# Patient Record
Sex: FEMALE | Race: WHITE | Employment: UNEMPLOYED | URBAN - METROPOLITAN AREA
[De-identification: names, ages, dates, MRNs, and addresses within clinical notes are randomized per-mention and may not be internally consistent; named-entity substitution may affect disease eponyms.]

---

## 2022-06-15 ENCOUNTER — APPOINTMENT (OUTPATIENT)
Dept: GENERAL RADIOLOGY | Age: 72
DRG: 190 | End: 2022-06-15
Payer: MEDICARE

## 2022-06-15 ENCOUNTER — HOSPITAL ENCOUNTER (INPATIENT)
Age: 72
LOS: 3 days | Discharge: HOME | DRG: 190 | End: 2022-06-18
Attending: INTERNAL MEDICINE | Admitting: INTERNAL MEDICINE
Payer: MEDICARE

## 2022-06-15 DIAGNOSIS — E87.1 HYPONATREMIA: ICD-10-CM

## 2022-06-15 DIAGNOSIS — N39.0 URINARY TRACT INFECTION WITHOUT HEMATURIA, SITE UNSPECIFIED: ICD-10-CM

## 2022-06-15 DIAGNOSIS — J44.1 COPD EXACERBATION (HCC): Primary | ICD-10-CM

## 2022-06-15 LAB
ALBUMIN SERPL-MCNC: 4 G/DL (ref 3.5–4.6)
ALP BLD-CCNC: 123 U/L (ref 40–130)
ALT SERPL-CCNC: 13 U/L (ref 0–33)
ANION GAP SERPL CALCULATED.3IONS-SCNC: 12 MEQ/L (ref 9–15)
APTT: 36.9 SEC (ref 24.4–36.8)
AST SERPL-CCNC: 16 U/L (ref 0–35)
BACTERIA: ABNORMAL /HPF
BASOPHILS ABSOLUTE: 0 K/UL (ref 0–0.2)
BASOPHILS RELATIVE PERCENT: 0.4 %
BILIRUB SERPL-MCNC: 0.4 MG/DL (ref 0.2–0.7)
BILIRUBIN URINE: NEGATIVE
BLOOD, URINE: NEGATIVE
BUN BLDV-MCNC: 8 MG/DL (ref 8–23)
CALCIUM SERPL-MCNC: 8.5 MG/DL (ref 8.5–9.9)
CHLORIDE BLD-SCNC: 88 MEQ/L (ref 95–107)
CLARITY: CLEAR
CO2: 26 MEQ/L (ref 20–31)
COLOR: YELLOW
CREAT SERPL-MCNC: 0.46 MG/DL (ref 0.5–0.9)
EOSINOPHILS ABSOLUTE: 0 K/UL (ref 0–0.7)
EOSINOPHILS RELATIVE PERCENT: 0.2 %
EPITHELIAL CELLS, UA: ABNORMAL /HPF (ref 0–5)
GFR AFRICAN AMERICAN: >60
GFR NON-AFRICAN AMERICAN: >60
GLOBULIN: 2.7 G/DL (ref 2.3–3.5)
GLUCOSE BLD-MCNC: 101 MG/DL (ref 70–99)
GLUCOSE URINE: NEGATIVE MG/DL
HCT VFR BLD CALC: 32.6 % (ref 37–47)
HEMOGLOBIN: 11.1 G/DL (ref 12–16)
HYALINE CASTS: ABNORMAL /HPF (ref 0–5)
INR BLD: 1.1
KETONES, URINE: ABNORMAL MG/DL
LACTIC ACID: 0.7 MMOL/L (ref 0.5–2.2)
LEUKOCYTE ESTERASE, URINE: ABNORMAL
LYMPHOCYTES ABSOLUTE: 1.1 K/UL (ref 1–4.8)
LYMPHOCYTES RELATIVE PERCENT: 9 %
MCH RBC QN AUTO: 32.1 PG (ref 27–31.3)
MCHC RBC AUTO-ENTMCNC: 34.2 % (ref 33–37)
MCV RBC AUTO: 93.9 FL (ref 82–100)
MONOCYTES ABSOLUTE: 1.1 K/UL (ref 0.2–0.8)
MONOCYTES RELATIVE PERCENT: 8.7 %
NEUTROPHILS ABSOLUTE: 9.9 K/UL (ref 1.4–6.5)
NEUTROPHILS RELATIVE PERCENT: 82 %
NITRITE, URINE: NEGATIVE
PDW BLD-RTO: 12.6 % (ref 11.5–14.5)
PH UA: 6 (ref 5–9)
PLATELET # BLD: 278 K/UL (ref 130–400)
PLATELET SLIDE REVIEW: NORMAL
POTASSIUM SERPL-SCNC: 4.1 MEQ/L (ref 3.4–4.9)
PRO-BNP: 768 PG/ML
PROCALCITONIN: 0.1 NG/ML (ref 0–0.15)
PROTEIN UA: NEGATIVE MG/DL
PROTHROMBIN TIME: 14.6 SEC (ref 12.3–14.9)
RBC # BLD: 3.47 M/UL (ref 4.2–5.4)
RBC # BLD: NORMAL 10*6/UL
RBC UA: ABNORMAL /HPF (ref 0–5)
SARS-COV-2, NAAT: NOT DETECTED
SODIUM BLD-SCNC: 126 MEQ/L (ref 135–144)
SPECIFIC GRAVITY UA: 1 (ref 1–1.03)
TOTAL CK: 99 U/L (ref 0–170)
TOTAL PROTEIN: 6.7 G/DL (ref 6.3–8)
TROPONIN: <0.01 NG/ML (ref 0–0.01)
URINE REFLEX TO CULTURE: YES
UROBILINOGEN, URINE: 0.2 E.U./DL
WBC # BLD: 12.1 K/UL (ref 4.8–10.8)
WBC UA: ABNORMAL /HPF (ref 0–5)

## 2022-06-15 PROCEDURE — 84484 ASSAY OF TROPONIN QUANT: CPT

## 2022-06-15 PROCEDURE — 87040 BLOOD CULTURE FOR BACTERIA: CPT

## 2022-06-15 PROCEDURE — 94761 N-INVAS EAR/PLS OXIMETRY MLT: CPT

## 2022-06-15 PROCEDURE — 87186 SC STD MICRODIL/AGAR DIL: CPT

## 2022-06-15 PROCEDURE — 6360000002 HC RX W HCPCS: Performed by: PERSONAL EMERGENCY RESPONSE ATTENDANT

## 2022-06-15 PROCEDURE — 6370000000 HC RX 637 (ALT 250 FOR IP): Performed by: INTERNAL MEDICINE

## 2022-06-15 PROCEDURE — 71045 X-RAY EXAM CHEST 1 VIEW: CPT

## 2022-06-15 PROCEDURE — 82550 ASSAY OF CK (CPK): CPT

## 2022-06-15 PROCEDURE — 85025 COMPLETE CBC W/AUTO DIFF WBC: CPT

## 2022-06-15 PROCEDURE — 83605 ASSAY OF LACTIC ACID: CPT

## 2022-06-15 PROCEDURE — 83880 ASSAY OF NATRIURETIC PEPTIDE: CPT

## 2022-06-15 PROCEDURE — 80053 COMPREHEN METABOLIC PANEL: CPT

## 2022-06-15 PROCEDURE — 85610 PROTHROMBIN TIME: CPT

## 2022-06-15 PROCEDURE — 6360000002 HC RX W HCPCS: Performed by: INTERNAL MEDICINE

## 2022-06-15 PROCEDURE — 85730 THROMBOPLASTIN TIME PARTIAL: CPT

## 2022-06-15 PROCEDURE — 87635 SARS-COV-2 COVID-19 AMP PRB: CPT

## 2022-06-15 PROCEDURE — 2580000003 HC RX 258: Performed by: PERSONAL EMERGENCY RESPONSE ATTENDANT

## 2022-06-15 PROCEDURE — 6360000002 HC RX W HCPCS: Performed by: PHYSICIAN ASSISTANT

## 2022-06-15 PROCEDURE — 1210000000 HC MED SURG R&B

## 2022-06-15 PROCEDURE — 87086 URINE CULTURE/COLONY COUNT: CPT

## 2022-06-15 PROCEDURE — 2580000003 HC RX 258: Performed by: INTERNAL MEDICINE

## 2022-06-15 PROCEDURE — 84145 PROCALCITONIN (PCT): CPT

## 2022-06-15 PROCEDURE — 93005 ELECTROCARDIOGRAM TRACING: CPT | Performed by: PHYSICIAN ASSISTANT

## 2022-06-15 PROCEDURE — 87077 CULTURE AEROBIC IDENTIFY: CPT

## 2022-06-15 PROCEDURE — 96374 THER/PROPH/DIAG INJ IV PUSH: CPT

## 2022-06-15 PROCEDURE — 36415 COLL VENOUS BLD VENIPUNCTURE: CPT

## 2022-06-15 PROCEDURE — 81001 URINALYSIS AUTO W/SCOPE: CPT

## 2022-06-15 PROCEDURE — 99285 EMERGENCY DEPT VISIT HI MDM: CPT

## 2022-06-15 PROCEDURE — 6370000000 HC RX 637 (ALT 250 FOR IP): Performed by: PHYSICIAN ASSISTANT

## 2022-06-15 PROCEDURE — 94640 AIRWAY INHALATION TREATMENT: CPT

## 2022-06-15 PROCEDURE — 94664 DEMO&/EVAL PT USE INHALER: CPT

## 2022-06-15 RX ORDER — IPRATROPIUM BROMIDE AND ALBUTEROL SULFATE 2.5; .5 MG/3ML; MG/3ML
1 SOLUTION RESPIRATORY (INHALATION) 3 TIMES DAILY
Status: DISCONTINUED | OUTPATIENT
Start: 2022-06-16 | End: 2022-06-18 | Stop reason: HOSPADM

## 2022-06-15 RX ORDER — EPINEPHRINE 0.3 MG/.3ML
0.3 INJECTION SUBCUTANEOUS
COMMUNITY
Start: 2021-08-30

## 2022-06-15 RX ORDER — MULTIVIT,TX WITH IRON,MINERALS
250 TABLET, EXTENDED RELEASE ORAL
COMMUNITY
Start: 2021-12-08

## 2022-06-15 RX ORDER — BUPROPION HYDROCHLORIDE 150 MG/1
TABLET, EXTENDED RELEASE ORAL
COMMUNITY
Start: 2021-07-12

## 2022-06-15 RX ORDER — METHYLPREDNISOLONE SODIUM SUCCINATE 125 MG/2ML
125 INJECTION, POWDER, LYOPHILIZED, FOR SOLUTION INTRAMUSCULAR; INTRAVENOUS ONCE
Status: COMPLETED | OUTPATIENT
Start: 2022-06-15 | End: 2022-06-15

## 2022-06-15 RX ORDER — BUDESONIDE 0.5 MG/2ML
0.5 INHALANT ORAL 2 TIMES DAILY
Status: DISCONTINUED | OUTPATIENT
Start: 2022-06-15 | End: 2022-06-18 | Stop reason: HOSPADM

## 2022-06-15 RX ORDER — SODIUM CHLORIDE 9 MG/ML
INJECTION, SOLUTION INTRAVENOUS CONTINUOUS
Status: DISCONTINUED | OUTPATIENT
Start: 2022-06-15 | End: 2022-06-18

## 2022-06-15 RX ORDER — IPRATROPIUM BROMIDE AND ALBUTEROL SULFATE 2.5; .5 MG/3ML; MG/3ML
1 SOLUTION RESPIRATORY (INHALATION) EVERY 4 HOURS PRN
Status: DISCONTINUED | OUTPATIENT
Start: 2022-06-15 | End: 2022-06-15

## 2022-06-15 RX ORDER — ATORVASTATIN CALCIUM 20 MG/1
20 TABLET, FILM COATED ORAL
Status: ON HOLD | COMMUNITY
Start: 2021-12-08 | End: 2022-06-18 | Stop reason: HOSPADM

## 2022-06-15 RX ORDER — AMLODIPINE BESYLATE 5 MG/1
5 TABLET ORAL DAILY
Status: DISCONTINUED | OUTPATIENT
Start: 2022-06-15 | End: 2022-06-18 | Stop reason: HOSPADM

## 2022-06-15 RX ORDER — SODIUM CHLORIDE 9 MG/ML
INJECTION, SOLUTION INTRAVENOUS PRN
Status: DISCONTINUED | OUTPATIENT
Start: 2022-06-15 | End: 2022-06-18 | Stop reason: HOSPADM

## 2022-06-15 RX ORDER — ACETAMINOPHEN 325 MG/1
650 TABLET ORAL EVERY 6 HOURS PRN
Status: DISCONTINUED | OUTPATIENT
Start: 2022-06-15 | End: 2022-06-18 | Stop reason: HOSPADM

## 2022-06-15 RX ORDER — KRILL/OM-3/DHA/EPA/PHOSPHO/AST 500-110 MG
500 CAPSULE ORAL
COMMUNITY
Start: 2021-12-08

## 2022-06-15 RX ORDER — ENOXAPARIN SODIUM 100 MG/ML
30 INJECTION SUBCUTANEOUS DAILY
Status: DISCONTINUED | OUTPATIENT
Start: 2022-06-15 | End: 2022-06-18 | Stop reason: HOSPADM

## 2022-06-15 RX ORDER — IPRATROPIUM BROMIDE AND ALBUTEROL SULFATE 2.5; .5 MG/3ML; MG/3ML
1 SOLUTION RESPIRATORY (INHALATION)
Status: DISCONTINUED | OUTPATIENT
Start: 2022-06-15 | End: 2022-06-15

## 2022-06-15 RX ORDER — SODIUM CHLORIDE 0.9 % (FLUSH) 0.9 %
5-40 SYRINGE (ML) INJECTION EVERY 12 HOURS SCHEDULED
Status: DISCONTINUED | OUTPATIENT
Start: 2022-06-15 | End: 2022-06-18 | Stop reason: HOSPADM

## 2022-06-15 RX ORDER — POLYETHYLENE GLYCOL 3350 17 G/17G
17 POWDER, FOR SOLUTION ORAL DAILY PRN
Status: DISCONTINUED | OUTPATIENT
Start: 2022-06-15 | End: 2022-06-18 | Stop reason: HOSPADM

## 2022-06-15 RX ORDER — ROSUVASTATIN CALCIUM 20 MG/1
20 TABLET, COATED ORAL NIGHTLY
Status: DISCONTINUED | OUTPATIENT
Start: 2022-06-15 | End: 2022-06-18 | Stop reason: HOSPADM

## 2022-06-15 RX ORDER — PROMETHAZINE HYDROCHLORIDE 12.5 MG/1
25 TABLET ORAL EVERY 6 HOURS PRN
Status: DISCONTINUED | OUTPATIENT
Start: 2022-06-15 | End: 2022-06-18 | Stop reason: HOSPADM

## 2022-06-15 RX ORDER — PREDNISONE 20 MG/1
40 TABLET ORAL DAILY
Status: DISCONTINUED | OUTPATIENT
Start: 2022-06-15 | End: 2022-06-18 | Stop reason: HOSPADM

## 2022-06-15 RX ORDER — 0.9 % SODIUM CHLORIDE 0.9 %
1000 INTRAVENOUS SOLUTION INTRAVENOUS ONCE
Status: COMPLETED | OUTPATIENT
Start: 2022-06-15 | End: 2022-06-15

## 2022-06-15 RX ORDER — BUPROPION HYDROCHLORIDE 150 MG/1
150 TABLET, EXTENDED RELEASE ORAL 2 TIMES DAILY
Status: DISCONTINUED | OUTPATIENT
Start: 2022-06-15 | End: 2022-06-18 | Stop reason: HOSPADM

## 2022-06-15 RX ORDER — ASPIRIN 81 MG/1
81 TABLET ORAL DAILY
Status: DISCONTINUED | OUTPATIENT
Start: 2022-06-15 | End: 2022-06-18 | Stop reason: HOSPADM

## 2022-06-15 RX ORDER — NICOTINE 21 MG/24HR
1 PATCH, TRANSDERMAL 24 HOURS TRANSDERMAL DAILY
Status: DISCONTINUED | OUTPATIENT
Start: 2022-06-15 | End: 2022-06-18 | Stop reason: HOSPADM

## 2022-06-15 RX ORDER — ONDANSETRON 2 MG/ML
4 INJECTION INTRAMUSCULAR; INTRAVENOUS EVERY 6 HOURS PRN
Status: DISCONTINUED | OUTPATIENT
Start: 2022-06-15 | End: 2022-06-18 | Stop reason: HOSPADM

## 2022-06-15 RX ORDER — ACETAMINOPHEN 650 MG/1
650 SUPPOSITORY RECTAL EVERY 6 HOURS PRN
Status: DISCONTINUED | OUTPATIENT
Start: 2022-06-15 | End: 2022-06-18 | Stop reason: HOSPADM

## 2022-06-15 RX ORDER — ROSUVASTATIN CALCIUM 20 MG/1
20 TABLET, COATED ORAL NIGHTLY
COMMUNITY
Start: 2022-05-02

## 2022-06-15 RX ORDER — ALBUTEROL SULFATE 2.5 MG/3ML
2.5 SOLUTION RESPIRATORY (INHALATION)
Status: DISCONTINUED | OUTPATIENT
Start: 2022-06-15 | End: 2022-06-18 | Stop reason: HOSPADM

## 2022-06-15 RX ORDER — ASCORBIC ACID 250 MG
250 TABLET ORAL
COMMUNITY
Start: 2021-12-08

## 2022-06-15 RX ORDER — FEXOFENADINE HCL 180 MG/1
180 TABLET ORAL
COMMUNITY
Start: 2021-12-08

## 2022-06-15 RX ORDER — SODIUM CHLORIDE 0.9 % (FLUSH) 0.9 %
5-40 SYRINGE (ML) INJECTION PRN
Status: DISCONTINUED | OUTPATIENT
Start: 2022-06-15 | End: 2022-06-18 | Stop reason: HOSPADM

## 2022-06-15 RX ORDER — AMLODIPINE BESYLATE 5 MG/1
5 TABLET ORAL
COMMUNITY
Start: 2021-12-08

## 2022-06-15 RX ADMIN — ASPIRIN 81 MG: 81 TABLET, COATED ORAL at 22:58

## 2022-06-15 RX ADMIN — IPRATROPIUM BROMIDE AND ALBUTEROL SULFATE 1 AMPULE: .5; 2.5 SOLUTION RESPIRATORY (INHALATION) at 17:54

## 2022-06-15 RX ADMIN — BUDESONIDE 500 MCG: 0.5 SUSPENSION RESPIRATORY (INHALATION) at 20:45

## 2022-06-15 RX ADMIN — SODIUM CHLORIDE: 9 INJECTION, SOLUTION INTRAVENOUS at 23:01

## 2022-06-15 RX ADMIN — METHYLPREDNISOLONE SODIUM SUCCINATE 125 MG: 125 INJECTION, POWDER, FOR SOLUTION INTRAMUSCULAR; INTRAVENOUS at 15:11

## 2022-06-15 RX ADMIN — PREDNISONE 40 MG: 20 TABLET ORAL at 22:56

## 2022-06-15 RX ADMIN — IPRATROPIUM BROMIDE AND ALBUTEROL SULFATE 1 AMPULE: 2.5; .5 SOLUTION RESPIRATORY (INHALATION) at 20:45

## 2022-06-15 RX ADMIN — ENOXAPARIN SODIUM 30 MG: 100 INJECTION SUBCUTANEOUS at 22:57

## 2022-06-15 RX ADMIN — CEFTRIAXONE SODIUM 1000 MG: 1 INJECTION, POWDER, FOR SOLUTION INTRAMUSCULAR; INTRAVENOUS at 18:51

## 2022-06-15 RX ADMIN — Medication 10 ML: at 23:05

## 2022-06-15 RX ADMIN — ROSUVASTATIN CALCIUM 20 MG: 20 TABLET, FILM COATED ORAL at 22:55

## 2022-06-15 RX ADMIN — BUPROPION HYDROCHLORIDE 150 MG: 150 TABLET, EXTENDED RELEASE ORAL at 22:57

## 2022-06-15 RX ADMIN — AMLODIPINE BESYLATE 5 MG: 5 TABLET ORAL at 22:57

## 2022-06-15 RX ADMIN — SODIUM CHLORIDE 1000 ML: 9 INJECTION, SOLUTION INTRAVENOUS at 18:53

## 2022-06-15 ASSESSMENT — ENCOUNTER SYMPTOMS
CONSTIPATION: 0
SHORTNESS OF BREATH: 1
VOMITING: 0
NAUSEA: 0
ABDOMINAL PAIN: 0
SORE THROAT: 0
COUGH: 1
ABDOMINAL DISTENTION: 0
WHEEZING: 0
RHINORRHEA: 0
COLOR CHANGE: 0
EYE DISCHARGE: 0
DIARRHEA: 0

## 2022-06-15 ASSESSMENT — PAIN - FUNCTIONAL ASSESSMENT: PAIN_FUNCTIONAL_ASSESSMENT: NONE - DENIES PAIN

## 2022-06-15 NOTE — H&P
Department of Internal Medicine  General Internal Medicine  Attending History and Physical      CHIEF COMPLAINT:  SOB    Reason for Admission:  Acute hypoxic respiratory failure 2/2 COPD, UTI    History Obtained From:  patient    HISTORY OF PRESENT ILLNESS:      The patient is a 74 y.o. female with significant past medical history of COPD, HTN/HLD, tobacco use who presents from her doctors office after she was found to be 70% on RA. Pt states that she made the appointment initially due to dysuria and frequency and thought she had a UTI, but states over the last 4 days, she has been getting progressively more sob and her home nebulizers have not been working. States that she was at [facility] last Saturday and there was a lot of cottonwood all over. States she only gets flare up maybe once or twice a year and she is unsure of any triggers but thinks this may have started it. Continues to smoke 1/2ppd but has not been smoking much in the last 4 days due to worsening breathing. She was given solumedrol and nebs by EMS and arrived in the ED on 2L O2 and SpO2 98%, but she had conversational dyspnea at first and when taken off the O2, her SpO2 dropped into the 80s. CXR with emphysema. Labs largely unremarkable. UA with LE and some bacteria. She was given abx, nebs and admitted for COPD exacerbation and symptomatic UTI. Past Medical History:        Diagnosis Date    COPD (chronic obstructive pulmonary disease) ([facility] [address]     Hyperlipidemia     Hypertension      Past Surgical History:        Procedure Laterality Date    HYSTERECTOMY (CERVIX STATUS UNKNOWN)         Medications Prior to Admission:    Not in a hospital admission. Allergies:  Patient has no known allergies. Social History:   1/2ppd smoker, occ etoh, no drugs    Family History:   History reviewed. No pertinent family history.   REVIEW OF SYSTEMS:  12 point ROS was negative unless otherwise noted in the HPI   PHYSICAL EXAM:    Vitals:  /72   Pulse (!) 107   Temp 98.6 °F (37 °C) (Oral)   Resp 23   Ht 5' 1\" (1.549 m)   Wt 106 lb (48.1 kg)   SpO2 95%   BMI 20.03 kg/m²     Constitutional: Awake and alert in no acute distress. Lying in bed comfortably  Head: Normocephalic, atraumatic  Eyes: EOMI, PERRLA  ENT: moist mucous membranes, nasal cannula  Neck: neck supple, trachea midline  Lungs: Good inspiratory effort, bilateral expiratory wheeze, no rhonchi, no rales  Heart: RRR, normal S1 and S2  GI: Soft, non-distended, non tender, no guarding, no rebound, +BS  MSK: no edema noted  Skin: warm, dry  Psych: appropriate affect       DATA:  CBC:   Lab Results   Component Value Date    WBC 12.1 06/15/2022    RBC 3.47 06/15/2022    HGB 11.1 06/15/2022    HCT 32.6 06/15/2022    MCV 93.9 06/15/2022    MCH 32.1 06/15/2022    MCHC 34.2 06/15/2022    RDW 12.6 06/15/2022     06/15/2022     CMP:    Lab Results   Component Value Date     06/15/2022    K 4.1 06/15/2022    CL 88 06/15/2022    CO2 26 06/15/2022    BUN 8 06/15/2022    CREATININE 0.46 06/15/2022    GFRAA >60.0 06/15/2022    LABGLOM >60.0 06/15/2022    GLUCOSE 101 06/15/2022    PROT 6.7 06/15/2022    LABALBU 4.0 06/15/2022    CALCIUM 8.5 06/15/2022    BILITOT 0.4 06/15/2022    ALKPHOS 123 06/15/2022    AST 16 06/15/2022    ALT 13 06/15/2022     ASSESSMENT AND PLAN:      # Acute hypoxic respiratory failure 2/2 COPD exacerbation  - not on home O2.  Was noted to be 70% on RA at [facility] office today and sent to the ED via EMS  - given steroids, nebs and weaned to 2L O2, but unable to wean off - wean off as tolerated  - CXR with emphysema  - COVID negative  - procalcitonin negative  - pulm consulted  - cont steroids, nebs    # Symptomatic UTI  - the initial reason for her appt today was dysuria  - UA with LE, bacteria  - started on Rocephin - will continue and complete 5 days total. PO abx at discharge  - follow cultures    # Tobacco use  - counseled on cessation  - nicotine patch    # HTN/HLD  - continued home meds as documented in care-everywhere    DVT: lovenox ppx    Disposition: Pt admitted after being found to be hypoxic at her doctors office. Treating COPD with steroids, nebs. Pulm consulted. Treating symptomatic UTI. Follow cultures. Anticipated length of stay greater than 48 hours.       Seun Coffey DO  Internal Medicine   Hospitalist    >45 minutes in total care time

## 2022-06-15 NOTE — ED PROVIDER NOTES
[facility]  eMERGENCY dEPARTMENT eNCOUnter      Pt Name: Brian Ross  MRN: [id number]  [place] 1950  Date of evaluation: 6/15/2022  Provider: Alexis Benedict PA-C    CHIEF COMPLAINT       Chief Complaint   Patient presents with    Shortness of Breath         HISTORY OF PRESENT ILLNESS   (Location/Symptom, Timing/Onset,Context/Setting, Quality, Duration, Modifying Factors, Severity)  Note limiting factors. Brian Ross is a 74 y.o. female who presents to the emergency department complaint of cough, and shortness of breath, which patient states been ongoing for the last 4 days, also complains of some burning, pain with urination. She states she had schedule appointment with her primary doctor today and during her visit, her saturations were found to be in the 70% range. She does have past history of COPD, she is not home O2 dependent. Cough is dry nonproductive, there is been no fevers, no chest pain, no nausea vomiting or dizziness. Past medical history is significant for hypertension, hyperlipidemia, COPD. Patient states she is tobacco smoker. HPI    NursingNotes were reviewed. REVIEW OF SYSTEMS    (2-9 systems for level 4, 10 or more for level 5)     Review of Systems   Constitutional: Negative for activity change, appetite change, fatigue and fever. HENT: Negative for congestion, ear discharge, ear pain, nosebleeds, rhinorrhea and sore throat. Eyes: Negative for discharge. Respiratory: Positive for cough and shortness of breath. Negative for wheezing. Cardiovascular: Negative for chest pain, palpitations and leg swelling. Gastrointestinal: Negative for abdominal distention, abdominal pain, constipation, diarrhea, nausea and vomiting. Genitourinary: Negative for difficulty urinating and dysuria. Musculoskeletal: Negative for arthralgias. Skin: Negative for color change. Neurological: Negative for dizziness, syncope, weakness, numbness and headaches. Smokeless tobacco: Never Used    Tobacco comment: x30+ years   Substance and Sexual Activity    Alcohol use: Yes     Comment: weekends    Drug use: Never    Sexual activity: None   Other Topics Concern    None   Social History Narrative    None     Social Determinants of Health     Financial Resource Strain:     Difficulty of Paying Living Expenses: Not on file   Food Insecurity:     Worried About Running Out of Food in the Last Year: Not on file    Farzaneh of Food in the Last Year: Not on file   Transportation Needs:     Lack of Transportation (Medical): Not on file    Lack of Transportation (Non-Medical):  Not on file   Physical Activity:     Days of Exercise per Week: Not on file    Minutes of Exercise per Session: Not on file   Stress:     Feeling of Stress : Not on file   Social Connections:     Frequency of Communication with Friends and Family: Not on file    Frequency of Social Gatherings with Friends and Family: Not on file    Attends Sabianist Services: Not on file    Active Member of [address] [place] [address] or Organizations: Not on file    Attends Club or Organization Meetings: Not on file    Marital Status: Not on file   Intimate Partner Violence:     Fear of Current or Ex-Partner: Not on file    Emotionally Abused: Not on file    Physically Abused: Not on file    Sexually Abused: Not on file   Housing Stability:     Unable to Pay for Housing in the Last Year: Not on file    Number of [place] in the Last Year: Not on file    Unstable Housing in the Last Year: Not on file       SCREENINGS    [place] Coma Scale  Eye Opening: Spontaneous  Best Verbal Response: Oriented  Best Motor Response: Obeys commands  Reymundo Coma Scale Score: 15 @FLOW([id number])@      PHYSICAL EXAM    (up to 7 for level 4, 8 or more for level 5)     ED Triage Vitals [06/15/22 1432]   BP Temp Temp Source Heart Rate Resp SpO2 Height Weight   (!) 140/72 98.6 °F (37 °C) Oral (!) 114 17 91 % 5' 1\" (1.549 m) 106 lb (48.1 kg) Physical Exam  Vitals and nursing note reviewed. Constitutional:       General: She is not in acute distress. Appearance: She is well-developed. She is not ill-appearing, toxic-appearing or diaphoretic. HENT:      Head: Normocephalic. Nose: No congestion. Mouth/Throat:      Mouth: Mucous membranes are moist.      Pharynx: No oropharyngeal exudate or posterior oropharyngeal erythema. Eyes:      Extraocular Movements: Extraocular movements intact. Conjunctiva/sclera: Conjunctivae normal.      Pupils: Pupils are equal, round, and reactive to light. Neck:      Vascular: No JVD. Trachea: No tracheal deviation. Cardiovascular:      Rate and Rhythm: Normal rate. Pulses: Normal pulses. Heart sounds: Normal heart sounds. No murmur heard. No friction rub. No gallop. Pulmonary:      Effort: Pulmonary effort is normal. No tachypnea, accessory muscle usage, respiratory distress or retractions. Breath sounds: No stridor. Examination of the right-upper field reveals decreased breath sounds. Examination of the left-upper field reveals decreased breath sounds. Examination of the right-middle field reveals decreased breath sounds. Examination of the left-middle field reveals decreased breath sounds. Examination of the right-lower field reveals decreased breath sounds. Examination of the left-lower field reveals decreased breath sounds. Decreased breath sounds present. No wheezing, rhonchi or rales. Comments: Diminished lung sounds in all fields, no wheezes rales or rhonchi, no excess muscle use, no retractions, saturations on 4 L nasal cannula oxygen is 91% patient is able to speak in full sentences. Chest:      Chest wall: No tenderness. Abdominal:      General: Abdomen is flat. Bowel sounds are normal. There is no distension or abdominal bruit. Palpations: There is no shifting dullness, fluid wave, hepatomegaly, splenomegaly, mass or pulsatile mass. Tenderness: There is no abdominal tenderness. There is no right CVA tenderness, left CVA tenderness, guarding or rebound. Negative signs include Barnett's sign, Rovsing's sign and McBurney's sign. Musculoskeletal:         General: No deformity. Cervical back: Normal range of motion and neck supple. No rigidity. Right lower leg: No edema. Left lower leg: No edema. Skin:     General: Skin is warm and dry. Capillary Refill: Capillary refill takes less than 2 seconds. Coloration: Skin is not jaundiced. Neurological:      General: No focal deficit present. Mental Status: She is alert and oriented to person, place, and time. Mental status is at baseline. Cranial Nerves: No cranial nerve deficit. Sensory: No sensory deficit. Motor: No weakness. Coordination: Coordination normal.   Psychiatric:         Mood and Affect: Mood normal.         DIAGNOSTIC RESULTS     EKG: All EKG's are interpreted by the Emergency Department Physician who either signs or Co-signsthis chart in the absence of a cardiologist.    EKG shows sinus tachycardia with premature atrial complexes at a 104 bpm, T wave inversions in leads V3 no ventricular ectopy  ms    RADIOLOGY:   Non-plain filmimages such as CT, Ultrasound and MRI are read by the radiologist. Plain radiographic images are visualized and preliminarily interpreted by the emergency physician with the below findings:        Interpretation per the Radiologist below, if available at the time ofthis note:    XR CHEST PORTABLE   Final Result   NO ACUTE CARDIOPULMONARY DISEASE. EMPHYSEMA.             ED BEDSIDE ULTRASOUND:   Performed by ED Physician - none    LABS:  Labs Reviewed   COMPREHENSIVE METABOLIC PANEL - Abnormal; Notable for the following components:       Result Value    Sodium 126 (*)     Chloride 88 (*)     Glucose 101 (*)     CREATININE 0.46 (*)     All other components within normal limits   CBC WITH AUTO DIFFERENTIAL - Abnormal; Notable for the following components:    WBC 12.1 (*)     RBC 3.47 (*)     Hemoglobin 11.1 (*)     Hematocrit 32.6 (*)     MCH 32.1 (*)     Neutrophils Absolute 9.9 (*)     Monocytes Absolute 1.1 (*)     All other components within normal limits   URINALYSIS WITH REFLEX TO CULTURE - Abnormal; Notable for the following components:    Ketones, Urine TRACE (*)     Leukocyte Esterase, Urine LARGE (*)     All other components within normal limits   APTT - Abnormal; Notable for the following components:    aPTT 36.9 (*)     All other components within normal limits   MICROSCOPIC URINALYSIS - Abnormal; Notable for the following components:    Bacteria, UA FEW (*)     WBC, UA 10-20 (*)     All other components within normal limits   BASIC METABOLIC PANEL W/ REFLEX TO MG FOR LOW K - Abnormal; Notable for the following components:    Glucose 150 (*)     CREATININE 0.42 (*)     Calcium 8.1 (*)     All other components within normal limits   COVID-19, RAPID   CULTURE, BLOOD 1   CULTURE, BLOOD 2   CULTURE, URINE   LACTIC ACID   TROPONIN   CK   PROTIME-INR   BRAIN NATRIURETIC PEPTIDE   PROCALCITONIN   CBC WITH AUTO DIFFERENTIAL       All other labs were within normal range or not returned as of this dictation. EMERGENCY DEPARTMENT COURSE and DIFFERENTIAL DIAGNOSIS/MDM:   Vitals:    Vitals:    06/15/22 1930 06/15/22 2015 06/15/22 2045 06/16/22 0100   BP: 134/80 (!) 151/76     Pulse: (!) 104 99 77    Resp: 27 18 18    Temp:  97.9 °F (36.6 °C)     TempSrc:  Oral     SpO2: 94% 93% 98%    Weight:    109 lb 9.1 oz (49.7 kg)   Height:             ED Course as of 06/16/22 0730   Wed Je 15, 2022   1824 Patient with history of hypertension, hyperlipidemia, COPD, carotid artery stenosis . she states for the past couple days she has been having dry cough with chest congestion, chest tightness, shortness of breath, wheezing, suprapubic abdominal pain and dysuria. Patient does smoke.   She does not have a nebulizer machine at home.  No blood thinners at home. She does admit to decreased appetite and mostly drinking coffee and minimal water. she went to doctor for concerns for UTI and was found to be hypoxic at 70s%. Ambulance gave epi, Solu-Medrol, DuoNeb, magnesium. Chest x-ray shows no acute process. WBC 12.1, sodium 126, chloride 88. Urine shows large leukocytes, few bacteria, 10-20 WBC. On reassessment patient still is tachypneic, using accessory muscles to breathe, 98 percent on 2 L. Lungs diminished throughout with faint expiratory wheezing and crackles in bilateral bases. Taken off oxygen she drops to 93% on room air. Not O2 dependent. Hyponatremia may be due to dehydration. Patient was given additional breathing treatments, Solu-Medrol, liter IV fluid and abx for UTI. She will be admitted at this time. [KO]      ED Course User Index  [KO] LOYD Juarez     MDM  Number of Diagnoses or Management Options  [facility]  Hyponatremia  Urinary tract infection without hematuria, site unspecified  Diagnosis management comments: Patient presents emerged part complaint of cough, shortness of breath which she states been ongoing for last 4 days, she went to see her regular family provider today, during the visit was found that her saturations were in the mid 80% range patient does have past history of COPD, she is not O2 dependent. On arrival to ED she was placed on supplemental O2 was doing well, saturation maintained about 94%. Chest x-ray shows no acute pulmonary process, labs are fairly nonspecific at this time. She was ambulated, and did become hypoxic short of breath during ambulation, and was admitted to the hospital under the [facility] hospitalist Dr. MEADE [facility]. Patient's admitting diagnosis that of COPD exacerbation hypoxia. CRITICAL CARE TIME   Total Critical Care time was 0 minutes, excluding separately reportableprocedures.   There was a high probability of clinicallysignificant/life

## 2022-06-16 LAB
ANION GAP SERPL CALCULATED.3IONS-SCNC: 9 MEQ/L (ref 9–15)
BASOPHILS ABSOLUTE: 0 K/UL (ref 0–0.2)
BASOPHILS RELATIVE PERCENT: 0 %
BUN BLDV-MCNC: 12 MG/DL (ref 8–23)
CALCIUM SERPL-MCNC: 8.1 MG/DL (ref 8.5–9.9)
CHLORIDE BLD-SCNC: 98 MEQ/L (ref 95–107)
CO2: 28 MEQ/L (ref 20–31)
CREAT SERPL-MCNC: 0.42 MG/DL (ref 0.5–0.9)
EKG ATRIAL RATE: 104 BPM
EKG P AXIS: 74 DEGREES
EKG P-R INTERVAL: 128 MS
EKG Q-T INTERVAL: 338 MS
EKG QRS DURATION: 82 MS
EKG QTC CALCULATION (BAZETT): 444 MS
EKG R AXIS: 72 DEGREES
EKG T AXIS: 36 DEGREES
EKG VENTRICULAR RATE: 104 BPM
EOSINOPHILS ABSOLUTE: 0 K/UL (ref 0–0.7)
EOSINOPHILS RELATIVE PERCENT: 0 %
GFR AFRICAN AMERICAN: >60
GFR NON-AFRICAN AMERICAN: >60
GLUCOSE BLD-MCNC: 150 MG/DL (ref 70–99)
HCT VFR BLD CALC: 34.1 % (ref 37–47)
HEMOGLOBIN: 11.6 G/DL (ref 12–16)
LYMPHOCYTES ABSOLUTE: 0.9 K/UL (ref 1–4.8)
LYMPHOCYTES RELATIVE PERCENT: 6.1 %
MCH RBC QN AUTO: 32.3 PG (ref 27–31.3)
MCHC RBC AUTO-ENTMCNC: 34 % (ref 33–37)
MCV RBC AUTO: 95.1 FL (ref 82–100)
MONOCYTES ABSOLUTE: 0.4 K/UL (ref 0.2–0.8)
MONOCYTES RELATIVE PERCENT: 3.1 %
NEUTROPHILS ABSOLUTE: 12.8 K/UL (ref 1.4–6.5)
NEUTROPHILS RELATIVE PERCENT: 90.8 %
PDW BLD-RTO: 12.8 % (ref 11.5–14.5)
PLATELET # BLD: 324 K/UL (ref 130–400)
POTASSIUM REFLEX MAGNESIUM: 4.3 MEQ/L (ref 3.4–4.9)
RBC # BLD: 3.58 M/UL (ref 4.2–5.4)
SODIUM BLD-SCNC: 135 MEQ/L (ref 135–144)
WBC # BLD: 14.1 K/UL (ref 4.8–10.8)

## 2022-06-16 PROCEDURE — 6360000002 HC RX W HCPCS: Performed by: INTERNAL MEDICINE

## 2022-06-16 PROCEDURE — 36415 COLL VENOUS BLD VENIPUNCTURE: CPT

## 2022-06-16 PROCEDURE — 6370000000 HC RX 637 (ALT 250 FOR IP): Performed by: INTERNAL MEDICINE

## 2022-06-16 PROCEDURE — 94150 VITAL CAPACITY TEST: CPT

## 2022-06-16 PROCEDURE — 80048 BASIC METABOLIC PNL TOTAL CA: CPT

## 2022-06-16 PROCEDURE — 2580000003 HC RX 258: Performed by: INTERNAL MEDICINE

## 2022-06-16 PROCEDURE — 94640 AIRWAY INHALATION TREATMENT: CPT

## 2022-06-16 PROCEDURE — 2700000000 HC OXYGEN THERAPY PER DAY

## 2022-06-16 PROCEDURE — 85025 COMPLETE CBC W/AUTO DIFF WBC: CPT

## 2022-06-16 PROCEDURE — 1210000000 HC MED SURG R&B

## 2022-06-16 PROCEDURE — 94761 N-INVAS EAR/PLS OXIMETRY MLT: CPT

## 2022-06-16 PROCEDURE — 99223 1ST HOSP IP/OBS HIGH 75: CPT | Performed by: INTERNAL MEDICINE

## 2022-06-16 RX ORDER — B-COMPLEX WITH VITAMIN C
140 TABLET ORAL DAILY
COMMUNITY
Start: 2021-12-08

## 2022-06-16 RX ORDER — OMEGA-3S/DHA/EPA/FISH OIL/D3 300MG-1000
10 CAPSULE ORAL DAILY
COMMUNITY
Start: 2021-12-08

## 2022-06-16 RX ADMIN — IPRATROPIUM BROMIDE AND ALBUTEROL SULFATE 1 AMPULE: 2.5; .5 SOLUTION RESPIRATORY (INHALATION) at 07:48

## 2022-06-16 RX ADMIN — SODIUM CHLORIDE: 9 INJECTION, SOLUTION INTRAVENOUS at 21:04

## 2022-06-16 RX ADMIN — CEFTRIAXONE SODIUM 1000 MG: 1 INJECTION, POWDER, FOR SOLUTION INTRAMUSCULAR; INTRAVENOUS at 18:51

## 2022-06-16 RX ADMIN — IPRATROPIUM BROMIDE AND ALBUTEROL SULFATE 1 AMPULE: 2.5; .5 SOLUTION RESPIRATORY (INHALATION) at 19:44

## 2022-06-16 RX ADMIN — PREDNISONE 40 MG: 20 TABLET ORAL at 08:43

## 2022-06-16 RX ADMIN — SODIUM CHLORIDE: 9 INJECTION, SOLUTION INTRAVENOUS at 11:09

## 2022-06-16 RX ADMIN — ENOXAPARIN SODIUM 30 MG: 100 INJECTION SUBCUTANEOUS at 08:44

## 2022-06-16 RX ADMIN — BUPROPION HYDROCHLORIDE 150 MG: 150 TABLET, EXTENDED RELEASE ORAL at 08:43

## 2022-06-16 RX ADMIN — IPRATROPIUM BROMIDE AND ALBUTEROL SULFATE 1 AMPULE: 2.5; .5 SOLUTION RESPIRATORY (INHALATION) at 12:39

## 2022-06-16 RX ADMIN — BUDESONIDE 500 MCG: 0.5 SUSPENSION RESPIRATORY (INHALATION) at 19:43

## 2022-06-16 RX ADMIN — BUDESONIDE 500 MCG: 0.5 SUSPENSION RESPIRATORY (INHALATION) at 07:49

## 2022-06-16 RX ADMIN — ROSUVASTATIN CALCIUM 20 MG: 20 TABLET, FILM COATED ORAL at 21:05

## 2022-06-16 RX ADMIN — BUPROPION HYDROCHLORIDE 150 MG: 150 TABLET, EXTENDED RELEASE ORAL at 21:05

## 2022-06-16 RX ADMIN — ASPIRIN 81 MG: 81 TABLET, COATED ORAL at 08:43

## 2022-06-16 RX ADMIN — Medication 10 ML: at 08:45

## 2022-06-16 RX ADMIN — Medication 10 ML: at 21:07

## 2022-06-16 NOTE — CONSULTS
Pulmonary and Critical Care Medicine  Consult Note  Encounter Date: 2022 2:41 PM    Ms. Mela Landau is a 74 y.o. female  : 1950  Requesting Provider: Kemal Person DO    Reason for request: COPD exacerbation            HISTORY OF PRESENT ILLNESS:    Patient is 74 y.o. presents with 4 days history of worsening shortness of breath, with cough productive of clear phlegm, chest pain mainly when she can cough otherwise none, no fever no chills, her grandson had viral illness recently, on arrival to ED she was saturating 70%, she feels better now, she is a smoker 10 cigarettes/day, no worsening lower extremity edema. Past Medical History:        Diagnosis Date    COPD (chronic obstructive pulmonary disease) (HCC)     Hyperlipidemia     Hypertension        Past Surgical History:        Procedure Laterality Date    HYSTERECTOMY (CERVIX STATUS UNKNOWN)         Social History:     reports that she has been smoking cigarettes. She has been smoking about 0.50 packs per day. She has never used smokeless tobacco. She reports current alcohol use. She reports that she does not use drugs. Family History:   History reviewed. No pertinent family history. No family history of lung disease  Allergies:   Other and Sulfamethoxazole        MEDICATIONS during current hospitalization:    Continuous Infusions:   sodium chloride      sodium chloride 100 mL/hr at 22 1109       Scheduled Meds:   sodium chloride flush  5-40 mL IntraVENous 2 times per day    enoxaparin  30 mg SubCUTAneous Daily    cefTRIAXone (ROCEPHIN) IV  1,000 mg IntraVENous Q24H    aspirin  81 mg Oral Daily    rosuvastatin  20 mg Oral Nightly    buPROPion  150 mg Oral BID    amLODIPine  5 mg Oral Daily    predniSONE  40 mg Oral Daily    budesonide  0.5 mg Nebulization BID    nicotine  1 patch TransDERmal Daily    ipratropium-albuterol  1 ampule Inhalation TID       PRN Meds:sodium chloride flush, sodium chloride, polyethylene glycol, acetaminophen **OR** acetaminophen, promethazine **OR** ondansetron, albuterol        REVIEW OF SYSTEMS:  ROS: 10 organs review of system is done including general, psychological, ENT, hematological, endocrine, respiratory, cardiovascular, gastrointestinal, musculoskeletal, neurological,  allergy and Immunology is done and is otherwise negative. PHYSICAL EXAM:    Vitals:  BP (!) 94/52   Pulse 99   Temp 98.2 °F (36.8 °C) (Oral)   Resp 16   Ht 5' 1\" (1.549 m)   Wt 109 lb 9.1 oz (49.7 kg)   SpO2 95%   BMI 20.70 kg/m²     General: alert, cooperative, no distress  Head: normocephalic, atraumatic  Eyes:No gross abnormalities. ENT:  MMM no lesions  Neck:  supple and no masses  Chest : Tight with bilateral wheezing, no rales, nontender, tympanic  Heart[de-identified] Heart sounds are normal.  Regular rate and rhythm without murmur, gallop or rub. ABD:  symmetric, soft, non-tender, no guarding or rebound  Musculoskeletal : no cyanosis, no clubbing and no edema  Neuro:  Grossly normal  Skin: No rashes or nodules noted. Lymph node:  no cervical nodes  Urology: No Rodriguez   Psychiatric: appropriate        Data Review  Recent Labs     06/15/22  1515 06/16/22  0606   WBC 12.1* 14.1*   HGB 11.1* 11.6*   HCT 32.6* 34.1*    324      Recent Labs     06/15/22  1515 06/16/22  0606   * 135   K 4.1 4.3   CL 88* 98   CO2 26 28   BUN 8 12   CREATININE 0.46* 0.42*   GLUCOSE 101* 150*       MV Settings: ABGs: No results for input(s): PHART, ERX4ALA, PO2ART, FXI6YFQ, BEART, U0UDOCVC, IMM2CJF in the last 72 hours.   O2 Device: Nasal cannula  O2 Flow Rate (L/min): 2 L/min  Lab Results   Component Value Date    LACTA 0.7 06/15/2022       Radiology  I personally reviewed imaging studies and no infiltrate        Assessment, plan:   Patient is at risk due to    · Acute hypoxic respiratory failure  · COPD with acute exacerbation  · Smoking     Recommendation  · Continue prednisone 40 mg daily  · Continue current nebs  · O2 to keep sat 88 to 90%  · Smoking cessation strongly recommended  · Incentive spirometry  · Patient on antibiotic for UTI, plan per primary team      Thank you for consultation    Electronically signed by Héctor Santana MD, [facility],  on 6/16/2022 at 2:41 PM

## 2022-06-16 NOTE — CARE COORDINATION
This Care Transition Nurse provided COPD booklet and zones sheet and reviewed. Stressed importance of phoning physician promptly for symptoms in the yellow zone and ems for symptoms in the red zone. Discussed cause of COPD, how lungs work, treatment. Discussed avoiding respiratory infection by good handwashing/hand sanitizing, masking to protect airway, avoiding ill contacts, keeping nebulizer setup and other respiratory equipment clean. Discussed common inhaled irritants to avoid. Discussed pursed lip breathing and abdominal breathing. Discussed positions  to reduce shortness of breath, energy conservation, COPD medications, importance of taking antibiotics and prednisone as ordered until gone, importance of keeping updated with vaccines. Discussed importance of nutrition, limiting sodium, drinking plenty of fluids especially water. Discussed benefits of Pulmonary Rehab. Stressed importance of smoking cessation. Informed of the Markie Suarez and Mel Jamison at [email or web address] as a resource for smoking cessation and more information about COPD. Stressed importance of physician (PCP) follow up within one week of discharge and follow up with pulmonologist as directed. Patient voiced understanding. Patient states she is smoking approximately 10 cigarettes per day. Strongly encouraged smoking cessation. She has quit smoking in the past and will quit again. She does not have a nebulizer at home and would like one. She has Stiolto Respimat inhaler which she uses once per day. Medication list states bid. Advised to check her prescription and use as directed. She state she also has an albuterol rescue inhaler. She does tend to wait too long to seek treatment. Stressed importance of phoning pulmonologist at first sign of symptoms in the yellow zone. She does drink some water but drinks mostly coffee. Cautioned against consuming too much caffeine and encouraged more water intake.  Patient states she was at [facility] and she noticed a lot of cottonwood trees shedding and thinks this may have been a trigger. She has also noticed a lot of pollen on her patio table which she has been cleaning off daily. Advised to wear a mask when cleaning or dusting. Patient has [facility] PCP and Pulmonologist. She will follow up with then as directed.

## 2022-06-16 NOTE — PROGRESS NOTES
Patient ambulated to bathroom and back to bed without oxygen on. Rt walked into  room for breathing tx when patient getting back into bed. Spo2 80% on room air. Placed 2L oxygen back on patient and Spo2 came up to 95%. RT placed extension tubing onto cannula to reach bathroom and talk to patient about leaving the oxygen on.  RT notified RN also.

## 2022-06-16 NOTE — PROGRESS NOTES
7167 - Assessment completed this shift. A&Ox4  VSS Pulse ox 95% on 3 L NC. Expiratory wheezes heard on ascultation. Pt has significant cough during periods of movement exacerbation. No mucous production noted. Abdomen is soft and flat. Bowel sounds are active. Pt denies having pain, nausea. Slight Bruises noted LUE. Pt ambulated to bathroom this morning w/o oxygen. Pt rechecked and saturations were at 78%: oxygen reapplied and pt sat was 92%+. Pt resting in bed with call light in reach. Meds given per [facility]    Per Dr. MEADE [facility] [employer], oxygen can be removed as she has been sating at 95%    Pt ambulated in recinos with PCA and . Upon returning to room, o2 was 87%. Slight dyspnea noted.      Electronically signed by Familia Godfrey RN on 6/16/2022 at 6:03 PM

## 2022-06-16 NOTE — PROGRESS NOTES
[facility] AT [place] Respiratory Therapy Evaluation   Current Order:  Duoneb Q4 PRN      Home Regimen: None      Ordering Physician: Marco Antonio  Re-evaluation Date:  6/18     Diagnosis: COPD Exac     Patient Status: Stable / Unstable + Physician notified    The following MDI Criteria must be met in order to convert aerosol to MDI with spacer.  If unable to meet, MDI will be converted to aerosol:  []  Patient able to demonstrate the ability to use MDI effectively  []  Patient alert and cooperative  []  Patient able to take deep breath with 5-10 second hold  []  Medication(s) available in this delivery method   []  Peak flow greater than or equal to 200 ml/min            Current Order Substituted To  (same drug, same frequency)   Aerosol to MDI [] Albuterol Sulfate 0.083% unit dose by aerosol Albuterol Sulfate MDI 2 puffs by inhalation with spacer    [] Levalbuterol 1.25 mg unit dose by aerosol Levalbuterol MDI 2 puffs by inhalation with spacer    [] Levalbuterol 0.63 mg unit dose by aerosol Levalbuterol MDI 2 puffs by inhalation with spacer    [] Ipratropium Bromide 0.02% unit dose by aerosol Ipratropium Bromide MDI 2 puffs by inhalation with spacer    [] Duoneb (Ipratropium + Albuterol) unit dose by aerosol Ipratropium MDI + Albuterol MDI 2 puffs by inhalation w/spacer   MDI to Aerosol [] Albuterol Sulfate MDI Albuterol Sulfate 0.083% unit dose by aerosol    [] Levalbuterol MDI 2 puffs by inhalation Levalbuterol 1.25 mg unit dose by aerosol    [] Ipratropium Bromide MDI by inhalation Ipratropium Bromide 0.02% unit dose by aerosol    [] Combivent (Ipratropium + Albuterol) MDI by inhalation Duoneb (Ipratropium + Albuterol) unit dose by aerosol       Treatment Assessment [Frequency/Schedule]:  Change frequency to: ________Duoneb TID & Albuterol Q2 PRN__________________________________________per Protocol, P&T, MEC      Points 0 1 2 3 4   Pulmonary Status  Non-Smoker  []   Smoking history   < 20 pack years  []   Smoking history  ?  20 pack years  []   Pulmonary Disorder  (acute or chronic)  []   Severe or Chronic w/ Exacerbation  [x]     Surgical Status No [x]   Surgeries     General []   Surgery Lower []   Abdominal Thoracic or []   Upper Abdominal Thoracic with  PulmonaryDisorder  []     Chest X-ray Clear/Not  Ordered     [x]  Chronic Changes  Results Pending  []  Infiltrates, atelectasis, pleural effusion, or edema  []  Infiltrates in more than one lobe []  Infiltrate + Atelectasis, &/or pleural effusion  []    Respiratory Pattern Regular,  RR = 12-20 [x]  Increased,  RR = 21-25 []  PARKER, irregular,  or RR = 26-30 []  Decreased FEV1  or RR = 31-35 []  Severe SOB, use  of accessory muscles, or RR ? 35  []    Mental Status Alert, oriented,  Cooperative [x]  Confused but Follows commands []  Lethargic or unable to follow commands []  Obtunded  []  Comatose  []    Breath Sounds Clear to  auscultation  []  Decreased unilaterally or  in bases only []  Decreased  bilaterally  [x]  Crackles or intermittent wheezes []  Wheezes []    Cough Strong, Spontan., & nonproductive [x]  Strong,  spontaneous, &  productive []  Weak,  Nonproductive []  Weak, productive or  with wheezes []  No spontaneous  cough or may require suctioning []    Level of Activity Ambulatory [x]  Ambulatory w/ Assist  []  Non-ambulatory []  Paraplegic []  Quadriplegic []    Total    Score:___6____     Triage Score:____4____      Tri       Triage:     1. (>20) Freq: Q3    2. (16-20) Freq: Q4   3. (11-15) Freq: QID & Albuterol Q2 PRN    4. (6-10) Freq: TID & Albuterol Q2 PRN    5. (0-5) Freq Q4prn

## 2022-06-16 NOTE — PLAN OF CARE
Problem: Discharge Planning  Goal: Discharge to home or other facility with appropriate resources  Outcome: Progressing  Flowsheets (Taken 6/15/2022 2740)  Discharge to home or other facility with appropriate resources: Identify discharge learning needs (meds, wound care, etc)     Problem: Safety - Adult  Goal: Free from fall injury  Outcome: Progressing

## 2022-06-16 NOTE — CARE COORDINATION
[facility] AT [place] Case Management Initial Discharge Assessment    Met with Patient to discuss discharge plan. PCP: CHAGO Iyer [address]                                Date of Last Visit:  2 months ago    [facility] Patient: No        [facility] Notified: no    If no PCP, list provided? N/A    Discharge Planning    Living Arrangements: independently at home    Who do you live with? , Elisa Valle     Who helps you with your care:  self    If lives at home:     Do you have any barriers navigating in your home? no    Patient can perform ADL? Yes    Current Services (outpatient and in home) :  None    Dialysis: No    Is transportation available to get to your appointments? Yes    DME Equipment:  no    Respiratory equipment: None    Respiratory provider:  no     Pharmacy:  yes - [facility] [employer] with Medication Assistance Program?  No      [facility]? No    Patient agreeable to SNF/Rehab? No    Other discharge needs identified? N/A    Does Patient Have a High-Risk for Readmission Diagnosis (CHF, PN, MI, COPD)? Yes / COPD        Initial Discharge Plan? (Note: please see concurrent daily documentation for any updates after initial note). Patient is alert, oriented and pleasant. Patient states that she will return home upon discharge. Patient is supported well by her , Elisa Valle. Patient denies needs.      Readmission Risk              Risk of Unplanned Readmission:  [address]  Electronically signed by DANETTE Gupta, ASHLEY on 6/16/2022 at 9:57 AM

## 2022-06-16 NOTE — PROGRESS NOTES
Department of Internal Medicine  General Internal Medicine  Attending Progress Note      SUBJECTIVE:  Pt seen and examined. Still sob, but improved today. Was on 2L but upon exam, O2 turned off and SpO2 95% so turned off. OBJECTIVE      Medications    Current Facility-Administered Medications: sodium chloride flush 0.9 % injection 5-40 mL, 5-40 mL, IntraVENous, 2 times per day  sodium chloride flush 0.9 % injection 5-40 mL, 5-40 mL, IntraVENous, PRN  0.9 % sodium chloride infusion, , IntraVENous, PRN  polyethylene glycol (GLYCOLAX) packet 17 g, 17 g, Oral, Daily PRN  enoxaparin Sodium (LOVENOX) injection 30 mg, 30 mg, SubCUTAneous, Daily  acetaminophen (TYLENOL) tablet 650 mg, 650 mg, Oral, Q6H PRN **OR** acetaminophen (TYLENOL) suppository 650 mg, 650 mg, Rectal, Q6H PRN  cefTRIAXone (ROCEPHIN) 1000 mg IVPB in 50 mL D5W minibag, 1,000 mg, IntraVENous, Q24H  aspirin EC tablet 81 mg, 81 mg, Oral, Daily  rosuvastatin (CRESTOR) tablet 20 mg, 20 mg, Oral, Nightly  buPROPion (WELLBUTRIN SR) extended release tablet 150 mg, 150 mg, Oral, BID  amLODIPine (NORVASC) tablet 5 mg, 5 mg, Oral, Daily  0.9 % sodium chloride infusion, , IntraVENous, Continuous  promethazine (PHENERGAN) tablet 25 mg, 25 mg, Oral, Q6H PRN **OR** ondansetron (ZOFRAN) injection 4 mg, 4 mg, IntraVENous, Q6H PRN  predniSONE (DELTASONE) tablet 40 mg, 40 mg, Oral, Daily  budesonide (PULMICORT) nebulizer suspension 500 mcg, 0.5 mg, Nebulization, BID  nicotine (NICODERM CQ) 14 MG/24HR 1 patch, 1 patch, TransDERmal, Daily  ipratropium-albuterol (DUONEB) nebulizer solution 1 ampule, 1 ampule, Inhalation, TID  albuterol (PROVENTIL) nebulizer solution 2.5 mg, 2.5 mg, Nebulization, Q2H PRN  Physical    VITALS:  BP (!) 94/52   Pulse 99   Temp 98.2 °F (36.8 °C) (Oral)   Resp 16   Ht 5' 1\" (1.549 m)   Wt 109 lb 9.1 oz (49.7 kg)   SpO2 95%   BMI 20.70 kg/m²   Constitutional: Awake and alert in no acute distress.  Lying in bed comfortably  Head: Normocephalic, atraumatic  Eyes: EOMI, PERRLA  ENT: moist mucous membranes  Neck: neck supple, trachea midline  Lungs: Good inspiratory effort, inspiratory and expiratory wheezes  Heart: RRR, normal S1 and S2  GI: Soft, non-distended, non tender, no guarding, no rebound, +BS  MSK: no edema noted  Skin: warm, dry  Psych: appropriate affect     Data    CBC:   Lab Results   Component Value Date    WBC 14.1 06/16/2022    RBC 3.58 06/16/2022    HGB 11.6 06/16/2022    HCT 34.1 06/16/2022    MCV 95.1 06/16/2022    MCH 32.3 06/16/2022    MCHC 34.0 06/16/2022    RDW 12.8 06/16/2022     06/16/2022     CMP:    Lab Results   Component Value Date     06/16/2022    K 4.3 06/16/2022    CL 98 06/16/2022    CO2 28 06/16/2022    BUN 12 06/16/2022    CREATININE 0.42 06/16/2022    GFRAA >60.0 06/16/2022    LABGLOM >60.0 06/16/2022    GLUCOSE 150 06/16/2022    PROT 6.7 06/15/2022    LABALBU 4.0 06/15/2022    CALCIUM 8.1 06/16/2022    BILITOT 0.4 06/15/2022    ALKPHOS 123 06/15/2022    AST 16 06/15/2022    ALT 13 06/15/2022       ASSESSMENT AND PLAN      # Acute hypoxic respiratory failure 2/2 COPD exacerbation  - not on home O2.  Was noted to be 70% on RA at [facility] office today and sent to the ED via EMS  - given steroids, nebs and weaned to 2L O2, but unable to wean off - weaned off today but still with significant wheezing  - CXR with emphysema  - COVID negative  - procalcitonin negative  - pulm consulted  - cont steroids, nebs     # Symptomatic UTI  - the initial reason for her office appt 6/15 was dysuria  - UA with LE, bacteria  - started on Rocephin - will continue and complete 5 days total. PO abx at discharge  - follow cultures    # Leukocytosis  - likely due to steroids  - already on abx for UTI     # Tobacco use  - counseled on cessation  - nicotine patch     # HTN/HLD  - continued home meds as documented in care-everywhere     DVT: lovenox ppx     Disposition: Pt admitted after being found to be hypoxic at her doctors office. Treating COPD with steroids, nebs. Pulm consulted. Treating symptomatic UTI. Follow cultures. Still with wheezing. Possible discharge tomorrow if improved.       Jarod Ferguson DO  Internal Medicine   Hospitalist    >35 minutes in total care time

## 2022-06-17 LAB
ANION GAP SERPL CALCULATED.3IONS-SCNC: 10 MEQ/L (ref 9–15)
BASOPHILS ABSOLUTE: 0 K/UL (ref 0–0.2)
BASOPHILS RELATIVE PERCENT: 0.1 %
BUN BLDV-MCNC: 8 MG/DL (ref 8–23)
CALCIUM SERPL-MCNC: 8.2 MG/DL (ref 8.5–9.9)
CHLORIDE BLD-SCNC: 101 MEQ/L (ref 95–107)
CO2: 26 MEQ/L (ref 20–31)
CREAT SERPL-MCNC: 0.49 MG/DL (ref 0.5–0.9)
EOSINOPHILS ABSOLUTE: 0 K/UL (ref 0–0.7)
EOSINOPHILS RELATIVE PERCENT: 0 %
GFR AFRICAN AMERICAN: >60
GFR NON-AFRICAN AMERICAN: >60
GLUCOSE BLD-MCNC: 99 MG/DL (ref 70–99)
HCT VFR BLD CALC: 31.9 % (ref 37–47)
HEMOGLOBIN: 10.7 G/DL (ref 12–16)
LYMPHOCYTES ABSOLUTE: 2.5 K/UL (ref 1–4.8)
LYMPHOCYTES RELATIVE PERCENT: 15.7 %
MCH RBC QN AUTO: 32.1 PG (ref 27–31.3)
MCHC RBC AUTO-ENTMCNC: 33.7 % (ref 33–37)
MCV RBC AUTO: 95.4 FL (ref 82–100)
MONOCYTES ABSOLUTE: 1.5 K/UL (ref 0.2–0.8)
MONOCYTES RELATIVE PERCENT: 9.5 %
NEUTROPHILS ABSOLUTE: 11.7 K/UL (ref 1.4–6.5)
NEUTROPHILS RELATIVE PERCENT: 74.7 %
ORGANISM: ABNORMAL
PDW BLD-RTO: 13.1 % (ref 11.5–14.5)
PLATELET # BLD: 334 K/UL (ref 130–400)
POTASSIUM REFLEX MAGNESIUM: 4 MEQ/L (ref 3.4–4.9)
RBC # BLD: 3.34 M/UL (ref 4.2–5.4)
SODIUM BLD-SCNC: 137 MEQ/L (ref 135–144)
URINE CULTURE, ROUTINE: ABNORMAL
URINE CULTURE, ROUTINE: ABNORMAL
WBC # BLD: 15.6 K/UL (ref 4.8–10.8)

## 2022-06-17 PROCEDURE — 6360000002 HC RX W HCPCS: Performed by: INTERNAL MEDICINE

## 2022-06-17 PROCEDURE — 94640 AIRWAY INHALATION TREATMENT: CPT

## 2022-06-17 PROCEDURE — 94660 CPAP INITIATION&MGMT: CPT

## 2022-06-17 PROCEDURE — 80048 BASIC METABOLIC PNL TOTAL CA: CPT

## 2022-06-17 PROCEDURE — 2700000000 HC OXYGEN THERAPY PER DAY

## 2022-06-17 PROCEDURE — 2580000003 HC RX 258: Performed by: INTERNAL MEDICINE

## 2022-06-17 PROCEDURE — 1210000000 HC MED SURG R&B

## 2022-06-17 PROCEDURE — 94761 N-INVAS EAR/PLS OXIMETRY MLT: CPT

## 2022-06-17 PROCEDURE — 6370000000 HC RX 637 (ALT 250 FOR IP): Performed by: INTERNAL MEDICINE

## 2022-06-17 PROCEDURE — 85025 COMPLETE CBC W/AUTO DIFF WBC: CPT

## 2022-06-17 PROCEDURE — 36415 COLL VENOUS BLD VENIPUNCTURE: CPT

## 2022-06-17 PROCEDURE — 99232 SBSQ HOSP IP/OBS MODERATE 35: CPT | Performed by: INTERNAL MEDICINE

## 2022-06-17 RX ORDER — GUAIFENESIN/DEXTROMETHORPHAN 100-10MG/5
10 SYRUP ORAL EVERY 4 HOURS PRN
Status: DISCONTINUED | OUTPATIENT
Start: 2022-06-17 | End: 2022-06-18 | Stop reason: HOSPADM

## 2022-06-17 RX ADMIN — BUDESONIDE 500 MCG: 0.5 SUSPENSION RESPIRATORY (INHALATION) at 20:26

## 2022-06-17 RX ADMIN — IPRATROPIUM BROMIDE AND ALBUTEROL SULFATE 1 AMPULE: 2.5; .5 SOLUTION RESPIRATORY (INHALATION) at 20:26

## 2022-06-17 RX ADMIN — IPRATROPIUM BROMIDE AND ALBUTEROL SULFATE 1 AMPULE: 2.5; .5 SOLUTION RESPIRATORY (INHALATION) at 13:30

## 2022-06-17 RX ADMIN — PREDNISONE 40 MG: 20 TABLET ORAL at 08:00

## 2022-06-17 RX ADMIN — SODIUM CHLORIDE: 9 INJECTION, SOLUTION INTRAVENOUS at 08:05

## 2022-06-17 RX ADMIN — GUAIFENESIN SYRUP AND DEXTROMETHORPHAN 10 ML: 100; 10 SYRUP ORAL at 19:59

## 2022-06-17 RX ADMIN — IPRATROPIUM BROMIDE AND ALBUTEROL SULFATE 1 AMPULE: 2.5; .5 SOLUTION RESPIRATORY (INHALATION) at 07:22

## 2022-06-17 RX ADMIN — BUDESONIDE 500 MCG: 0.5 SUSPENSION RESPIRATORY (INHALATION) at 07:22

## 2022-06-17 RX ADMIN — BUPROPION HYDROCHLORIDE 150 MG: 150 TABLET, EXTENDED RELEASE ORAL at 08:00

## 2022-06-17 RX ADMIN — Medication 10 ML: at 20:02

## 2022-06-17 RX ADMIN — BUPROPION HYDROCHLORIDE 150 MG: 150 TABLET, EXTENDED RELEASE ORAL at 19:58

## 2022-06-17 RX ADMIN — CEFTRIAXONE SODIUM 1000 MG: 1 INJECTION, POWDER, FOR SOLUTION INTRAMUSCULAR; INTRAVENOUS at 20:02

## 2022-06-17 RX ADMIN — ENOXAPARIN SODIUM 30 MG: 100 INJECTION SUBCUTANEOUS at 08:00

## 2022-06-17 RX ADMIN — ROSUVASTATIN CALCIUM 20 MG: 20 TABLET, FILM COATED ORAL at 19:58

## 2022-06-17 RX ADMIN — ASPIRIN 81 MG: 81 TABLET, COATED ORAL at 08:00

## 2022-06-17 RX ADMIN — Medication 10 ML: at 08:01

## 2022-06-17 RX ADMIN — AMLODIPINE BESYLATE 5 MG: 5 TABLET ORAL at 08:00

## 2022-06-17 RX ADMIN — SODIUM CHLORIDE: 9 INJECTION, SOLUTION INTRAVENOUS at 18:42

## 2022-06-17 NOTE — PROGRESS NOTES
Department of Internal Medicine  General Internal Medicine  Attending Progress Note      SUBJECTIVE:  Pt seen and examined. Still sob. Had a rough night and was up every hour due to coughing. Less wheeze today. Cough medication ordered. Spoke to patient about possibility of going home with O2 and pt expressed understanding and agreeable if needed.      OBJECTIVE      Medications    Current Facility-Administered Medications: guaiFENesin-dextromethorphan (ROBITUSSIN DM) 100-10 MG/5ML syrup 10 mL, 10 mL, Oral, Q4H PRN  sodium chloride flush 0.9 % injection 5-40 mL, 5-40 mL, IntraVENous, 2 times per day  sodium chloride flush 0.9 % injection 5-40 mL, 5-40 mL, IntraVENous, PRN  0.9 % sodium chloride infusion, , IntraVENous, PRN  polyethylene glycol (GLYCOLAX) packet 17 g, 17 g, Oral, Daily PRN  enoxaparin Sodium (LOVENOX) injection 30 mg, 30 mg, SubCUTAneous, Daily  acetaminophen (TYLENOL) tablet 650 mg, 650 mg, Oral, Q6H PRN **OR** acetaminophen (TYLENOL) suppository 650 mg, 650 mg, Rectal, Q6H PRN  cefTRIAXone (ROCEPHIN) 1000 mg IVPB in 50 mL D5W minibag, 1,000 mg, IntraVENous, Q24H  aspirin EC tablet 81 mg, 81 mg, Oral, Daily  rosuvastatin (CRESTOR) tablet 20 mg, 20 mg, Oral, Nightly  buPROPion (WELLBUTRIN SR) extended release tablet 150 mg, 150 mg, Oral, BID  amLODIPine (NORVASC) tablet 5 mg, 5 mg, Oral, Daily  0.9 % sodium chloride infusion, , IntraVENous, Continuous  promethazine (PHENERGAN) tablet 25 mg, 25 mg, Oral, Q6H PRN **OR** ondansetron (ZOFRAN) injection 4 mg, 4 mg, IntraVENous, Q6H PRN  predniSONE (DELTASONE) tablet 40 mg, 40 mg, Oral, Daily  budesonide (PULMICORT) nebulizer suspension 500 mcg, 0.5 mg, Nebulization, BID  nicotine (NICODERM CQ) 14 MG/24HR 1 patch, 1 patch, TransDERmal, Daily  ipratropium-albuterol (DUONEB) nebulizer solution 1 ampule, 1 ampule, Inhalation, TID  albuterol (PROVENTIL) nebulizer solution 2.5 mg, 2.5 mg, Nebulization, Q2H PRN  Physical    VITALS:  /73   Pulse 89   Temp 97.9 °F (36.6 °C) (Oral)   Resp 18   Ht 5' 1\" (1.549 m)   Wt 117 lb (53.1 kg)   SpO2 96%   BMI 22.11 kg/m²   Constitutional: Awake and alert in no acute distress. Lying in bed comfortably  Head: Normocephalic, atraumatic  Eyes: EOMI, PERRLA  ENT: moist mucous membranes  Neck: neck supple, trachea midline  Lungs: Good inspiratory effort, inspiratory and expiratory wheezes improved  Heart: RRR, normal S1 and S2  GI: Soft, non-distended, non tender, no guarding, no rebound, +BS  MSK: no edema noted  Skin: warm, dry  Psych: appropriate affect     Data    CBC:   Lab Results   Component Value Date    WBC 15.6 06/17/2022    RBC 3.34 06/17/2022    HGB 10.7 06/17/2022    HCT 31.9 06/17/2022    MCV 95.4 06/17/2022    MCH 32.1 06/17/2022    MCHC 33.7 06/17/2022    RDW 13.1 06/17/2022     06/17/2022     CMP:    Lab Results   Component Value Date     06/17/2022    K 4.0 06/17/2022     06/17/2022    CO2 26 06/17/2022    BUN 8 06/17/2022    CREATININE 0.49 06/17/2022    GFRAA >60.0 06/17/2022    LABGLOM >60.0 06/17/2022    GLUCOSE 99 06/17/2022    PROT 6.7 06/15/2022    LABALBU 4.0 06/15/2022    CALCIUM 8.2 06/17/2022    BILITOT 0.4 06/15/2022    ALKPHOS 123 06/15/2022    AST 16 06/15/2022    ALT 13 06/15/2022       ASSESSMENT AND PLAN      # Acute hypoxic respiratory failure 2/2 COPD exacerbation  - not on home O2. Was noted to be 70% on RA at [facility] office today and sent to the ED via EMS  - given steroids, nebs and weaned to 2L O2, but unable to wean off - weaned off but was placed back on 2L overnight due to hypoxia. Home O2 eval at discharge  - CXR with emphysema  - COVID negative  - procalcitonin negative  - pulm consulted  - cont steroids, nebs.  Added robitussin today for dry cough     # Symptomatic UTI  - the initial reason for her office appt 6/15 was dysuria  - UA with LE, bacteria  - started on Rocephin - will continue and complete 5 days total. PO abx at discharge  - follow cultures- pan sensitive proteus. # Leukocytosis  - likely due to steroids  - already on abx for UTI     # Tobacco use  - counseled on cessation  - nicotine patch     # HTN/HLD  - continued home meds as documented in care-everywhere     DVT: lovenox ppx     Disposition: Pt admitted after being found to be hypoxic at her doctors office. Treating COPD with steroids, nebs. Pulm consulted. Treating symptomatic UTI. Follow cultures. Still with wheezing.  Home O2 eval tomorrow and discharge if improved      Key Samaniego,   Internal Medicine   Hospitalist    >35 minutes in total care time

## 2022-06-17 NOTE — PROGRESS NOTES
INPATIENT PROGRESS NOTES    PATIENT NAME: Cece Avila  MRN: [id number]  SERVICE DATE:  June 17, 2022   SERVICE TIME:  11:44 AM      PRIMARY SERVICE: Pulmonary Disease    CHIEF COMPLAINTS: COPD exacerbation    INTERVAL HPI: Patient seen and examined at bedside, Interval Notes, orders reviewed. Nursing notes noted    Patient report patient is more tired and short of breath today, did not sleep good overnight, no chest pain, she has cough essentially dry, no fever no chills, no nausea no vomiting, she is on 2 L O2 saturation 96%    Review of system:     GI Abdominal pain No  Skin Rash No    Social History     Tobacco Use    Smoking status: Current Every Day Smoker     Packs/day: 0.50     Types: Cigarettes    Smokeless tobacco: Never Used    Tobacco comment: x30+ years   Substance Use Topics    Alcohol use: Yes     Comment: weekends     History reviewed. No pertinent family history. OBJECTIVE    Body mass index is 20.7 kg/m². PHYSICAL EXAM:  Vitals:  /73   Pulse 89   Temp 97.9 °F (36.6 °C) (Oral)   Resp 18   Ht 5' 1\" (1.549 m)   Wt 109 lb 9.1 oz (49.7 kg)   SpO2 96%   BMI 20.70 kg/m²     General: alert, cooperative, no distress  Head: normocephalic, atraumatic  Eyes:No gross abnormalities. ENT:  MMM no lesions  Neck:  supple and no masses  Chest : Tight, with wheezing, no rales, nontender, tympanic  Heart[de-identified] Heart sounds are normal.  Regular rate and rhythm without murmur, gallop or rub. ABD:  symmetric, soft, non-tender  Musculoskeletal : no cyanosis, no clubbing and no edema  Neuro:  Grossly normal  Skin: No rashes or nodules noted.   Lymph node:  no cervical nodes  Urology: No Rodriguez   Psychiatric: appropriate    DATA:   Recent Labs     06/16/22  0606 06/17/22  0633   WBC 14.1* 15.6*   HGB 11.6* 10.7*   HCT 34.1* 31.9*   MCV 95.1 95.4    334     Recent Labs     06/15/22  1515 06/15/22  1515 06/16/22  0606 06/16/22  0606 06/17/22  0633   *   < > 135  --  137   K 4.1  --  4.3  -- 4.0   CL 88*   < > 98  --  101   CO2 26   < > 28  --  26   BUN 8   < > 12  --  8   CREATININE 0.46*   < > 0.42*  --  0.49*   GLUCOSE 101*   < > 150*  --  99   CALCIUM 8.5   < > 8.1*  --  8.2*   PROT 6.7  --   --   --   --    LABALBU 4.0  --   --   --   --    BILITOT 0.4  --   --   --   --    ALKPHOS 123  --   --   --   --    AST 16  --   --   --   --    ALT 13  --   --   --   --    LABGLOM >60.0   < > >60.0   < > >60.0   GFRAA >60.0   < > >60.0   < > >60.0   GLOB 2.7  --   --   --   --     < > = values in this interval not displayed. MV Settings:          No results for input(s): PHART, NIR3FZZ, PO2ART, XEE0FCP, BEART, [id number] in the last 72 hours. O2 Device: Nasal cannula  O2 Flow Rate (L/min): 2 L/min    ADULT DIET; Regular     MEDICATIONS during current hospitalization:    Continuous Infusions:   sodium chloride      sodium chloride 100 mL/hr at 06/17/22 0805       Scheduled Meds:   sodium chloride flush  5-40 mL IntraVENous 2 times per day    enoxaparin  30 mg SubCUTAneous Daily    cefTRIAXone (ROCEPHIN) IV  1,000 mg IntraVENous Q24H    aspirin  81 mg Oral Daily    rosuvastatin  20 mg Oral Nightly    buPROPion  150 mg Oral BID    amLODIPine  5 mg Oral Daily    predniSONE  40 mg Oral Daily    budesonide  0.5 mg Nebulization BID    nicotine  1 patch TransDERmal Daily    ipratropium-albuterol  1 ampule Inhalation TID       PRN Meds:sodium chloride flush, sodium chloride, polyethylene glycol, acetaminophen **OR** acetaminophen, promethazine **OR** ondansetron, albuterol    Radiology  XR CHEST PORTABLE    Result Date: 6/15/2022  EXAMINATION: XR CHEST PORTABLE CLINICAL HISTORY: SHORTNESS OF BREATH COMPARISONS: None available. FINDINGS: Thoracic scoliosis convexity to right apex T9-10. . Cardiopericardial silhouette is normal. Pulmonary vasculature is normal. Lungs are clear and hyperexpanded. NO ACUTE CARDIOPULMONARY DISEASE. EMPHYSEMA.             IMPRESSION AND SUGGESTION:  Patient is at risk due to   · Acute hypoxic respiratory failure  · COPD with acute exacerbation  · Smoking  · UTI      Recommendation  · Continue prednisone 40 mg daily  · Budesonide nebs twice daily  · DuoNeb every 6 hours  · BiPAP as needed and while asleep  · Smoking cessation counseling done  · Pulmonary hygiene         Electronically signed by Adithya Goddard MD,  FCCP   on 6/17/2022 at 11:44 AM

## 2022-06-17 NOTE — PROGRESS NOTES
9279 - Assessment completed this shift. A&Ox4   VSS   BP: 129/73  Pulse ox 96% 2 L NC. Expiratory wheezes heard on ascultation: lungs diminished. Pt has productive cough. Voice is raspy. Bowel sounds are active and abdomen is soft and non tender. Pt denies having pain or nausea. Pt does have dyspnea on exertion. Meds given per MAR. Pt resting in bed with call light in reach. PLAN: Wean off oxygen, ambulate in halls with assistance.  If okay, possible d/c tomorrow per Dr. Zeina Romero     Electronically signed by Jory Blackburn RN on 6/17/2022 at 5:06 PM

## 2022-06-17 NOTE — PROGRESS NOTES
6/17/22    From: HOME     Admit: COPD    PMH:COPD, HDL, HTN    Anticipated Discharge Disposition: HOME    Patient Mobility or PT/OT ordered:  Consults:     Clinical: COVID 6/15 NEG                WBC 14.1 - 15.6    Barriers to Discharge: 6/17:TRYING TO WEEN OFF O2 AND IF SHE DOES NOT WEAN BY TOMORROW DR HUTCHISON WILL ORDER A HOME O2 EVALUATION  PAIN    Assessments:   CMI AND DCCOP COMPLETED

## 2022-06-18 VITALS
HEART RATE: 87 BPM | SYSTOLIC BLOOD PRESSURE: 156 MMHG | OXYGEN SATURATION: 97 % | DIASTOLIC BLOOD PRESSURE: 72 MMHG | HEIGHT: 61 IN | TEMPERATURE: 97.7 F | RESPIRATION RATE: 12 BRPM | WEIGHT: 117.6 LBS | BODY MASS INDEX: 22.2 KG/M2

## 2022-06-18 LAB
ANION GAP SERPL CALCULATED.3IONS-SCNC: 8 MEQ/L (ref 9–15)
BASOPHILS ABSOLUTE: 0 K/UL (ref 0–0.2)
BASOPHILS RELATIVE PERCENT: 0.3 %
BUN BLDV-MCNC: 4 MG/DL (ref 8–23)
CALCIUM SERPL-MCNC: 8.2 MG/DL (ref 8.5–9.9)
CHLORIDE BLD-SCNC: 98 MEQ/L (ref 95–107)
CO2: 32 MEQ/L (ref 20–31)
CREAT SERPL-MCNC: 0.46 MG/DL (ref 0.5–0.9)
EOSINOPHILS ABSOLUTE: 0 K/UL (ref 0–0.7)
EOSINOPHILS RELATIVE PERCENT: 0.3 %
GFR AFRICAN AMERICAN: >60
GFR NON-AFRICAN AMERICAN: >60
GLUCOSE BLD-MCNC: 88 MG/DL (ref 70–99)
HCT VFR BLD CALC: 31 % (ref 37–47)
HEMOGLOBIN: 10.5 G/DL (ref 12–16)
LYMPHOCYTES ABSOLUTE: 2.8 K/UL (ref 1–4.8)
LYMPHOCYTES RELATIVE PERCENT: 29.2 %
MAGNESIUM: 1.7 MG/DL (ref 1.7–2.4)
MCH RBC QN AUTO: 32.1 PG (ref 27–31.3)
MCHC RBC AUTO-ENTMCNC: 33.7 % (ref 33–37)
MCV RBC AUTO: 95.1 FL (ref 82–100)
MONOCYTES ABSOLUTE: 1.2 K/UL (ref 0.2–0.8)
MONOCYTES RELATIVE PERCENT: 12.1 %
NEUTROPHILS ABSOLUTE: 5.6 K/UL (ref 1.4–6.5)
NEUTROPHILS RELATIVE PERCENT: 58.1 %
PDW BLD-RTO: 13.1 % (ref 11.5–14.5)
PLATELET # BLD: 320 K/UL (ref 130–400)
POTASSIUM REFLEX MAGNESIUM: 3.1 MEQ/L (ref 3.4–4.9)
RBC # BLD: 3.26 M/UL (ref 4.2–5.4)
SODIUM BLD-SCNC: 138 MEQ/L (ref 135–144)
WBC # BLD: 9.6 K/UL (ref 4.8–10.8)

## 2022-06-18 PROCEDURE — 6360000002 HC RX W HCPCS: Performed by: INTERNAL MEDICINE

## 2022-06-18 PROCEDURE — 94640 AIRWAY INHALATION TREATMENT: CPT

## 2022-06-18 PROCEDURE — 2700000000 HC OXYGEN THERAPY PER DAY

## 2022-06-18 PROCEDURE — 99232 SBSQ HOSP IP/OBS MODERATE 35: CPT | Performed by: INTERNAL MEDICINE

## 2022-06-18 PROCEDURE — 36415 COLL VENOUS BLD VENIPUNCTURE: CPT

## 2022-06-18 PROCEDURE — 80048 BASIC METABOLIC PNL TOTAL CA: CPT

## 2022-06-18 PROCEDURE — 6370000000 HC RX 637 (ALT 250 FOR IP): Performed by: INTERNAL MEDICINE

## 2022-06-18 PROCEDURE — 2580000003 HC RX 258: Performed by: INTERNAL MEDICINE

## 2022-06-18 PROCEDURE — 94760 N-INVAS EAR/PLS OXIMETRY 1: CPT

## 2022-06-18 PROCEDURE — 85025 COMPLETE CBC W/AUTO DIFF WBC: CPT

## 2022-06-18 PROCEDURE — 83735 ASSAY OF MAGNESIUM: CPT

## 2022-06-18 RX ORDER — NICOTINE 21 MG/24HR
1 PATCH, TRANSDERMAL 24 HOURS TRANSDERMAL DAILY
Qty: 30 PATCH | Refills: 3 | Status: SHIPPED | OUTPATIENT
Start: 2022-06-19

## 2022-06-18 RX ORDER — CEPHALEXIN 500 MG/1
500 CAPSULE ORAL 3 TIMES DAILY
Qty: 15 CAPSULE | Refills: 0 | Status: SHIPPED | OUTPATIENT
Start: 2022-06-18 | End: 2022-06-23

## 2022-06-18 RX ORDER — GUAIFENESIN/DEXTROMETHORPHAN 100-10MG/5
10 SYRUP ORAL EVERY 4 HOURS PRN
Qty: 120 ML | Refills: 0 | Status: SHIPPED | OUTPATIENT
Start: 2022-06-18 | End: 2022-06-28

## 2022-06-18 RX ORDER — IPRATROPIUM BROMIDE AND ALBUTEROL SULFATE 2.5; .5 MG/3ML; MG/3ML
1 SOLUTION RESPIRATORY (INHALATION) EVERY 6 HOURS PRN
Qty: 360 ML | Refills: 0 | Status: SHIPPED | OUTPATIENT
Start: 2022-06-18 | End: 2022-07-18

## 2022-06-18 RX ORDER — ASPIRIN 81 MG/1
81 TABLET ORAL DAILY
Qty: 30 TABLET | Refills: 3 | Status: SHIPPED | OUTPATIENT
Start: 2022-06-19

## 2022-06-18 RX ORDER — ALBUTEROL SULFATE 90 UG/1
2 AEROSOL, METERED RESPIRATORY (INHALATION) EVERY 6 HOURS PRN
Qty: 18 G | Refills: 3 | Status: SHIPPED | OUTPATIENT
Start: 2022-06-18

## 2022-06-18 RX ORDER — PREDNISONE 20 MG/1
40 TABLET ORAL DAILY
Qty: 10 TABLET | Refills: 0 | Status: SHIPPED | OUTPATIENT
Start: 2022-06-19 | End: 2022-06-24

## 2022-06-18 RX ADMIN — ASPIRIN 81 MG: 81 TABLET, COATED ORAL at 09:31

## 2022-06-18 RX ADMIN — BUPROPION HYDROCHLORIDE 150 MG: 150 TABLET, EXTENDED RELEASE ORAL at 09:31

## 2022-06-18 RX ADMIN — Medication 10 ML: at 09:32

## 2022-06-18 RX ADMIN — PREDNISONE 40 MG: 20 TABLET ORAL at 09:31

## 2022-06-18 RX ADMIN — AMLODIPINE BESYLATE 5 MG: 5 TABLET ORAL at 09:30

## 2022-06-18 RX ADMIN — IPRATROPIUM BROMIDE AND ALBUTEROL SULFATE 1 AMPULE: 2.5; .5 SOLUTION RESPIRATORY (INHALATION) at 07:14

## 2022-06-18 RX ADMIN — BUDESONIDE 500 MCG: 0.5 SUSPENSION RESPIRATORY (INHALATION) at 07:14

## 2022-06-18 RX ADMIN — ENOXAPARIN SODIUM 30 MG: 100 INJECTION SUBCUTANEOUS at 09:31

## 2022-06-18 RX ADMIN — IPRATROPIUM BROMIDE AND ALBUTEROL SULFATE 1 AMPULE: 2.5; .5 SOLUTION RESPIRATORY (INHALATION) at 14:34

## 2022-06-18 RX ADMIN — SODIUM CHLORIDE: 9 INJECTION, SOLUTION INTRAVENOUS at 05:21

## 2022-06-18 NOTE — CARE COORDINATION
Discharge today. Home o2 order sent to Kesha Johnson from [employer]. Nurse instructed on how to teach pt. On the [place], the portable o2 condenser. Pulse ox provided by nurse.

## 2022-06-18 NOTE — DISCHARGE SUMMARY
Physician Discharge Summary     Patient ID:  Li Mann  [id number]  69 y.o.  1950    Admit date: 6/15/2022    Discharge date : 06/18/22     Admitting Physician: Kush Meza DO     Discharge Physician: Kush Meza DO     Admission Diagnoses: Hyponatremia [E87.1]  COPD exacerbation ([facility] [address] [J44.1]  Urinary tract infection without hematuria, site unspecified [N39.0]    Discharge Diagnoses: COPD, UTI    Admission Condition: fair    Discharged Condition: good    Hospital Course: 74 y.o. female with significant past medical history of COPD, HTN/HLD, tobacco use who presents from her doctors office after she was found to be 70% on RA. Pt states that she made the appointment initially due to dysuria and frequency and thought she had a UTI, but states over the last 4 days, she has been getting progressively more sob and her home nebulizers have not been working. States that she was at [facility] last Saturday and there was a lot of [place]wood all over. States she only gets flare up maybe once or twice a year and she is unsure of any triggers but thinks this may have started it. Continues to smoke 1/2ppd but has not been smoking much in the last 4 days due to worsening breathing. She was given solumedrol and nebs by EMS and arrived in the ED on 2L O2 and SpO2 98%, but she had conversational dyspnea at first and when taken off the O2, her SpO2 dropped into the 80s. CXR with emphysema. Labs largely unremarkable. UA with LE and some bacteria. She was given abx, nebs and admitted for COPD exacerbation and symptomatic UTI. Pulm consulted. Pt symptomatically improved with treatment. Was able to be weaned off O2 at rest, but home O2 eval, she did qualify for 2L with ambulation which was set up at discharge. Pulm ok with discharge on 6/18 and pt discharged with Rx for prednisone, duonebs and nebulizer machine along with home O2 which was set up.  Pt discharged home in stable and improved condition and will follow up with PCP next week to re-evaluate her need for O2. Pt also given nicotine patches at discharge and counseled extensively on quitting smoking. Consults: pulmonary/intensive care    Discharge Exam:  Constitutional: Awake and alert in no acute distress. Lying in bed comfortably  Head: Normocephalic, atraumatic  Eyes: EOMI, PERRLA  ENT: moist mucous membranes  Neck: neck supple, trachea midline  Lungs: Good inspiratory effort, no wheeze  Heart: RRR, normal S1 and S2  GI: Soft, non-distended, non tender, no guarding, no rebound, +BS  MSK: no edema noted  Skin: warm, dry  Psych: appropriate affect    Labs:   Recent Labs     06/16/22  0606 06/17/22  0633 06/18/22  0629   WBC 14.1* 15.6* 9.6   HGB 11.6* 10.7* 10.5*   HCT 34.1* 31.9* 31.0*    334 320     Recent Labs     06/16/22 0606 06/17/22 0633 06/18/22  0629    137 138   K 4.3 4.0 3.1*   CL 98 101 98   CO2 28 26 32*   BUN 12 8 4*   CREATININE 0.42* 0.49* 0.46*   CALCIUM 8.1* 8.2* 8.2*     No results for input(s): AST, ALT, BILIDIR, BILITOT, ALKPHOS in the last 72 hours. No results for input(s): INR in the last 72 hours. No results for input(s): [place] Dec in the last 72 hours. Urinalysis:   Lab Results   Component Value Date    NITRU Negative 06/15/2022    WBCUA 10-20 06/15/2022    BACTERIA FEW 06/15/2022    RBCUA 0-2 06/15/2022    BLOODU Negative 06/15/2022    SPECGRAV 1.004 06/15/2022    GLUCOSEU Negative 06/15/2022       Radiology:   Most recent    Chest CT      WITH CONTRAST:No results found for this or any previous visit. WITHOUT CONTRAST: No results found for this or any previous visit. CXR      2-view: No results found for this or any previous visit. Portable: Results for orders placed during the hospital encounter of 06/15/22    XR CHEST PORTABLE    Narrative  EXAMINATION: XR CHEST PORTABLE    CLINICAL HISTORY: SHORTNESS OF BREATH    COMPARISONS: None available.     FINDINGS: Thoracic scoliosis convexity to right apex T9-10.. Cardiopericardial silhouette is normal. Pulmonary vasculature is normal. Lungs are clear and hyperexpanded. Impression  NO ACUTE CARDIOPULMONARY DISEASE. EMPHYSEMA. Echo No results found for this or any previous visit.       Disposition: home    In process/preliminary results:  Outstanding Order Results     Date and Time Order Name Status Description    6/15/2022  4:08 PM Culture, Blood 2 Preliminary     6/15/2022  3:22 PM Culture, Blood 1 Preliminary           Patient Instructions:   Current Discharge Medication List      START taking these medications    Details   aspirin 81 MG EC tablet Take 1 tablet by mouth daily  Qty: 30 tablet, Refills: 3      ipratropium-albuterol (DUONEB) 0.5-2.5 (3) MG/3ML SOLN nebulizer solution Inhale 3 mLs into the lungs every 6 hours as needed for Shortness of Breath  Qty: 360 mL, Refills: 0      albuterol sulfate HFA (PROVENTIL HFA) 108 (90 Base) MCG/ACT inhaler Inhale 2 puffs into the lungs every 6 hours as needed for Wheezing  Qty: 18 g, Refills: 3      cephALEXin (KEFLEX) 500 MG capsule Take 1 capsule by mouth 3 times daily for 5 days  Qty: 15 capsule, Refills: 0      predniSONE (DELTASONE) 20 MG tablet Take 2 tablets by mouth daily for 5 days  Qty: 10 tablet, Refills: 0      guaiFENesin-dextromethorphan (ROBITUSSIN DM) 100-10 MG/5ML syrup Take 10 mLs by mouth every 4 hours as needed for Cough  Qty: 120 mL, Refills: 0      nicotine (NICODERM CQ) 14 MG/24HR Place 1 patch onto the skin daily  Qty: 30 patch, Refills: 3         CONTINUE these medications which have NOT CHANGED    Details   vitamin D3 (CHOLECALCIFEROL) 10 MCG (400 UNIT) TABS tablet Take 10 mcg by mouth daily      Zinc 100 MG TABS Take 140 mg by mouth daily      amLODIPine (NORVASC) 5 MG tablet Take 5 mg by mouth      ascorbic acid (VITAMIN C) 250 MG tablet Take 250 mg by mouth      buPROPion (WELLBUTRIN SR) 150 MG extended release tablet Take one 1 tablet daily for 1 week then increase to 1 tablet twice daily      EPINEPHrine (EPIPEN) 0.3 MG/0.3ML SOAJ injection Inject 0.3 mg into the muscle      fexofenadine (ALLEGRA) 180 MG tablet Take 180 mg by mouth      Krill Oil (OMEGA-3) 500 MG CAPS Take 500 mg by mouth      Magnesium Gluconate 250 MG TABS Take 250 mg by mouth      rosuvastatin (CRESTOR) 20 MG tablet Take 20 mg by mouth nightly      tiotropium-olodaterol (STIOLTO RESPIMAT) 2.5-2.5 MCG/ACT AERS USE 2 INHALATIONS ONCE DAILY AS INSTRUCTED         STOP taking these medications       atorvastatin (LIPITOR) 20 MG tablet Comments:   Reason for Stopping:             Activity: activity as tolerated  Diet: regular diet  Wound Care: none needed    Follow-up with Ashwin Garsia MD  in 1 week.     DC time 35 minutes    Signed:  Electronically signed by Piper Ambrocio DO on 6/18/2022 at 4:47 PM

## 2022-06-18 NOTE — PROGRESS NOTES
PT WORE BIPAP FOR 1 HOUR AND COULDN'T TOLERATE ANYMORE. PT IS OFF BIPAP NOW AND RESTING ON ROOM AIR COMFORTABLY.

## 2022-06-18 NOTE — PROGRESS NOTES
RT Evaluation for Home Oxygen    Resting (Room Air):  SpO2:  86  HR:      Resting (On O2):  SpO2:   97  HR:    O2 Device:  NC  O2 Flow Rate (L/min):  2  FIO2 (%):    Comments:      During Walk (Room Air):  SpO2:    HR:    Walk/Assistance Device:    Rate of Dyspnea:    Symptoms:    Comments:      During Walk (On O2):  SpO2:  96  HR:    O2 Device:  NC  O2 Flow Rate (L/min):  2  O2 Flow Rate:    O2 Saturation:    O2 Flow Rate:    O2 Saturation:    O2 Flow Rate:    O2 Saturation:    O2 Flow Rate:    O2 Saturation:    O2 Flow Rate:    O2 Saturation:    Walk/Assistance Device:    Rate of Dyspnea:    Symptoms:    Comments:      After Walk:  SpO2:  97  HR:    O2 Device:  NC  O2 Flow Rate (L/min):  2  FIO2 (%):    Rate of Dyspnea:    Symptoms:    Comments:    Does the Patient Qualify for Home O2:  YES  Liter Flow at Rest:  2  Liter Flow on Exertion:  2  Does the Patient Need Portable Oxygen Tanks:  YES      Additional Comments: Qualifies for 2L continuous    Electronically signed by Pamela Pérez RCP on 6/18/2022 at 9:38 AM

## 2022-06-18 NOTE — PROGRESS NOTES
Patient given discharge and follow up instructions. Patient given portable home o2, and pulse ox. Able to verbalize understanding of instructions.

## 2022-06-18 NOTE — PROGRESS NOTES
Pulmonary progress Note    2022 8:19 AM    Subjective:   Admit Date: 6/15/2022  PCP: Alicia Corrales    Chief Complaint   Patient presents with    Shortness of Breath     Interval History: Currently on 2 L of oxygen. Feels about 50-75% better. She has been ambulating but has not had oxygen assessment. No fever or chills. 14 points review of systems has been obtained and negative except to was mentioned in HPI. Medications:   Scheduled Meds:   sodium chloride flush  5-40 mL IntraVENous 2 times per day    enoxaparin  30 mg SubCUTAneous Daily    cefTRIAXone (ROCEPHIN) IV  1,000 mg IntraVENous Q24H    aspirin  81 mg Oral Daily    rosuvastatin  20 mg Oral Nightly    buPROPion  150 mg Oral BID    amLODIPine  5 mg Oral Daily    predniSONE  40 mg Oral Daily    budesonide  0.5 mg Nebulization BID    nicotine  1 patch TransDERmal Daily    ipratropium-albuterol  1 ampule Inhalation TID     Continuous Infusions:   sodium chloride      sodium chloride 100 mL/hr at 22 0521         Objective:   Vitals:   Temp (24hrs), Av.9 °F (36.6 °C), Min:97.9 °F (36.6 °C), Max:97.9 °F (36.6 °C)    /66   Pulse 86   Temp 97.9 °F (36.6 °C)   Resp 12   Ht 5' 1\" (1.549 m)   Wt 117 lb 9.6 oz (53.3 kg)   SpO2 97%   BMI 22.22 kg/m²   I/O:24HR INTAKE/OUTPUT:      Intake/Output Summary (Last 24 hours) at 2022 0819  Last data filed at 2022 0611  Gross per 24 hour   Intake 1320 ml   Output --   Net 1320 ml      0701 -  0700  In: 2980 [P.O.:320;  I.V.:1000]  Out: -   CVP:           Physical Exam:  General appearance - alert, well appearing, and in no distress  Mental status - alert, oriented to person, place, and time  Eyes - pupils equal and reactive, extraocular eye movements intact  Nose - normal and patent, no erythema, discharge or polyps  Neck - supple, no significant adenopathy  Chest - clear to auscultation, no wheezes, rales or rhonchi, symmetric air entry  Heart - normal rate, regular rhythm, normal S1, S2, no murmurs, rubs, clicks or gallops  Abdomen - soft, nontender, nondistended, no masses or organomegaly  Rectal - deferred, not clinically indicated  Neurological - alert, oriented, normal speech, no focal findings or movement disorder noted, motor and sensory grossly normal bilaterally  Musculoskeletal - no joint tenderness, deformity or swelling  Extremities - peripheral pulses normal, no pedal edema, no clubbing or cyanosis  Skin - normal coloration and turgor, no rashes, no suspicious skin lesions noted              BMP:    Recent Labs     06/16/22  0606 06/16/22  0606 06/17/22  0633 06/18/22  0629      < > 137 138   K 4.3   < > 4.0 3.1*   CL 98   < > 101 98   CO2 28  --  26 32*   BUN 12   < > 8 4*   CREATININE 0.42*   < > 0.49* 0.46*   GLUCOSE 150*   < > 99 88    < > = values in this interval not displayed. .  MG:3,PHOS:3)@  Ionized Calcium: No results found for: IONCA  CBC:   Recent Labs     06/17/22  0633 06/18/22  0629   WBC 15.6* 9.6   HGB 10.7* 10.5*    320      ABG: No results for input(s): PH, PCO2, PO2 in the last 72 hours. Assessment and Plan:      Impression :  -Acute hypoxic respiratory failure. This is improving. This is due to COPD exacerbation.  -Acute COPD exacerbation. Improving.  -UTI. -Tobacco abuse disorder. Recommendations:    -Continue steroids. Total 5 days of 40 mg prednisone.  -Continue steroid nebulizers. -Bronchodilators around-the-clock.  -We will need oxygen assessment before discharge.  -Return to [facility] on discharge.   Stable for discharge from pulmonary standpoint.      - electronically signed by Carrie Billings MD on 6/18/2022 at 8:19 AM

## 2022-06-21 LAB
BLOOD CULTURE, ROUTINE: NORMAL
CULTURE, BLOOD 2: NORMAL

## 2023-07-19 ENCOUNTER — APPOINTMENT (OUTPATIENT)
Dept: GENERAL RADIOLOGY | Age: 73
DRG: 191 | End: 2023-07-19
Payer: MEDICARE

## 2023-07-19 ENCOUNTER — HOSPITAL ENCOUNTER (INPATIENT)
Age: 73
LOS: 4 days | Discharge: HOME | DRG: 191 | End: 2023-07-23
Attending: EMERGENCY MEDICINE | Admitting: INTERNAL MEDICINE
Payer: MEDICARE

## 2023-07-19 DIAGNOSIS — R06.89 HYPERCARBIA: ICD-10-CM

## 2023-07-19 DIAGNOSIS — J44.1 COPD EXACERBATION (HCC): Primary | ICD-10-CM

## 2023-07-19 LAB
ALBUMIN SERPL-MCNC: 4 G/DL (ref 3.5–4.6)
ALP SERPL-CCNC: 143 U/L (ref 40–130)
ALT SERPL-CCNC: 15 U/L (ref 0–33)
ANION GAP SERPL CALCULATED.3IONS-SCNC: 10 MEQ/L (ref 9–15)
AST SERPL-CCNC: 242 U/L (ref 0–35)
BASE EXCESS ARTERIAL: 6 (ref -3–3)
BASOPHILS # BLD: 0 K/UL (ref 0–0.2)
BASOPHILS NFR BLD: 0 %
BILIRUB SERPL-MCNC: <0.2 MG/DL (ref 0.2–0.7)
BUN SERPL-MCNC: 20 MG/DL (ref 8–23)
CALCIUM IONIZED: 1.21 MMOL/L (ref 1.12–1.32)
CALCIUM SERPL-MCNC: 9.1 MG/DL (ref 8.5–9.9)
CHLORIDE SERPL-SCNC: 85 MEQ/L (ref 95–107)
CO2 SERPL-SCNC: 31 MEQ/L (ref 20–31)
CREAT SERPL-MCNC: 0.36 MG/DL (ref 0.5–0.9)
EOSINOPHIL # BLD: 0 K/UL (ref 0–0.7)
EOSINOPHIL NFR BLD: 0 %
ERYTHROCYTE [DISTWIDTH] IN BLOOD BY AUTOMATED COUNT: 13 % (ref 11.5–14.5)
GLOBULIN SER CALC-MCNC: 3.2 G/DL (ref 2.3–3.5)
GLUCOSE BLD-MCNC: 132 MG/DL (ref 70–99)
GLUCOSE SERPL-MCNC: 139 MG/DL (ref 70–99)
HCO3 ARTERIAL: 32.3 MMOL/L (ref 21–29)
HCT VFR BLD AUTO: 37.2 % (ref 37–47)
HCT VFR BLD AUTO: 41 % (ref 36–48)
HGB BLD CALC-MCNC: 13.9 GM/DL (ref 12–16)
HGB BLD-MCNC: 12.4 G/DL (ref 12–16)
LACTATE: 0.76 MMOL/L (ref 0.4–2)
LYMPHOCYTES # BLD: 0.5 K/UL (ref 1–4.8)
LYMPHOCYTES NFR BLD: 3 %
MAGNESIUM SERPL-MCNC: 2 MG/DL (ref 1.7–2.4)
MCH RBC QN AUTO: 32.4 PG (ref 27–31.3)
MCHC RBC AUTO-ENTMCNC: 33.4 % (ref 33–37)
MCV RBC AUTO: 97 FL (ref 79.4–94.8)
METAMYELOCYTES NFR BLD MANUAL: 1 %
MONOCYTES # BLD: 1.1 K/UL (ref 0.2–0.8)
MONOCYTES NFR BLD: 7 %
NEUTROPHILS # BLD: 14 K/UL (ref 1.4–6.5)
NEUTS BAND NFR BLD MANUAL: 1 % (ref 5–11)
NEUTS SEG NFR BLD: 88 %
O2 SAT, ARTERIAL: 98 % (ref 93–100)
PCO2 ARTERIAL: 69 MM HG (ref 35–45)
PERFORMED ON: ABNORMAL
PH ARTERIAL: 7.28 (ref 7.35–7.45)
PLATELET # BLD AUTO: 341 K/UL (ref 130–400)
PLATELET BLD QL SMEAR: ADEQUATE
PO2 ARTERIAL: 114 MM HG (ref 75–108)
POC CHLORIDE: 88 MEQ/L (ref 99–110)
POC CREATININE: 0.5 MG/DL (ref 0.6–1.2)
POC FIO2: 5
POC SAMPLE TYPE: ABNORMAL
POTASSIUM SERPL-SCNC: 4.4 MEQ/L (ref 3.5–5.1)
POTASSIUM SERPL-SCNC: 4.7 MEQ/L (ref 3.4–4.9)
PROT SERPL-MCNC: 7.2 G/DL (ref 6.3–8)
RBC # BLD AUTO: 3.84 M/UL (ref 4.2–5.4)
RBC MORPH BLD: NORMAL
SODIUM BLD-SCNC: 124 MEQ/L (ref 136–145)
SODIUM SERPL-SCNC: 126 MEQ/L (ref 135–144)
TCO2 ARTERIAL: 35 MMOL/L (ref 21–32)
TROPONIN T SERPL-MCNC: <0.01 NG/ML (ref 0–0.01)
WBC # BLD AUTO: 15.5 K/UL (ref 4.8–10.8)

## 2023-07-19 PROCEDURE — 83735 ASSAY OF MAGNESIUM: CPT

## 2023-07-19 PROCEDURE — 85014 HEMATOCRIT: CPT

## 2023-07-19 PROCEDURE — 94640 AIRWAY INHALATION TREATMENT: CPT

## 2023-07-19 PROCEDURE — 85025 COMPLETE CBC W/AUTO DIFF WBC: CPT

## 2023-07-19 PROCEDURE — 83605 ASSAY OF LACTIC ACID: CPT

## 2023-07-19 PROCEDURE — 82803 BLOOD GASES ANY COMBINATION: CPT

## 2023-07-19 PROCEDURE — 99285 EMERGENCY DEPT VISIT HI MDM: CPT

## 2023-07-19 PROCEDURE — 71045 X-RAY EXAM CHEST 1 VIEW: CPT

## 2023-07-19 PROCEDURE — 84132 ASSAY OF SERUM POTASSIUM: CPT

## 2023-07-19 PROCEDURE — 84484 ASSAY OF TROPONIN QUANT: CPT

## 2023-07-19 PROCEDURE — 82435 ASSAY OF BLOOD CHLORIDE: CPT

## 2023-07-19 PROCEDURE — 82330 ASSAY OF CALCIUM: CPT

## 2023-07-19 PROCEDURE — 80053 COMPREHEN METABOLIC PANEL: CPT

## 2023-07-19 PROCEDURE — 94761 N-INVAS EAR/PLS OXIMETRY MLT: CPT

## 2023-07-19 PROCEDURE — 6370000000 HC RX 637 (ALT 250 FOR IP): Performed by: EMERGENCY MEDICINE

## 2023-07-19 PROCEDURE — 5A09457 ASSISTANCE WITH RESPIRATORY VENTILATION, 24-96 CONSECUTIVE HOURS, CONTINUOUS POSITIVE AIRWAY PRESSURE: ICD-10-PCS | Performed by: INTERNAL MEDICINE

## 2023-07-19 PROCEDURE — 6370000000 HC RX 637 (ALT 250 FOR IP): Performed by: INTERNAL MEDICINE

## 2023-07-19 PROCEDURE — 82565 ASSAY OF CREATININE: CPT

## 2023-07-19 PROCEDURE — 84295 ASSAY OF SERUM SODIUM: CPT

## 2023-07-19 PROCEDURE — 1210000000 HC MED SURG R&B

## 2023-07-19 PROCEDURE — 94660 CPAP INITIATION&MGMT: CPT

## 2023-07-19 PROCEDURE — 2580000003 HC RX 258: Performed by: EMERGENCY MEDICINE

## 2023-07-19 PROCEDURE — 36415 COLL VENOUS BLD VENIPUNCTURE: CPT

## 2023-07-19 PROCEDURE — 6360000002 HC RX W HCPCS: Performed by: INTERNAL MEDICINE

## 2023-07-19 PROCEDURE — 6360000002 HC RX W HCPCS: Performed by: EMERGENCY MEDICINE

## 2023-07-19 PROCEDURE — 2580000003 HC RX 258: Performed by: INTERNAL MEDICINE

## 2023-07-19 PROCEDURE — 6370000000 HC RX 637 (ALT 250 FOR IP): Performed by: REGISTERED NURSE

## 2023-07-19 PROCEDURE — 36600 WITHDRAWAL OF ARTERIAL BLOOD: CPT

## 2023-07-19 PROCEDURE — 93005 ELECTROCARDIOGRAM TRACING: CPT | Performed by: EMERGENCY MEDICINE

## 2023-07-19 RX ORDER — ONDANSETRON 4 MG/1
4 TABLET, ORALLY DISINTEGRATING ORAL EVERY 8 HOURS PRN
Status: DISCONTINUED | OUTPATIENT
Start: 2023-07-19 | End: 2023-07-23 | Stop reason: HOSPADM

## 2023-07-19 RX ORDER — SODIUM CHLORIDE 9 MG/ML
INJECTION, SOLUTION INTRAVENOUS PRN
Status: DISCONTINUED | OUTPATIENT
Start: 2023-07-19 | End: 2023-07-23 | Stop reason: HOSPADM

## 2023-07-19 RX ORDER — AZITHROMYCIN 250 MG/1
250 TABLET, FILM COATED ORAL DAILY
Status: COMPLETED | OUTPATIENT
Start: 2023-07-20 | End: 2023-07-23

## 2023-07-19 RX ORDER — PREDNISONE 20 MG/1
40 TABLET ORAL DAILY
Status: ON HOLD | COMMUNITY
End: 2023-07-22 | Stop reason: HOSPADM

## 2023-07-19 RX ORDER — IPRATROPIUM BROMIDE AND ALBUTEROL SULFATE 2.5; .5 MG/3ML; MG/3ML
1 SOLUTION RESPIRATORY (INHALATION)
Status: DISCONTINUED | OUTPATIENT
Start: 2023-07-19 | End: 2023-07-23 | Stop reason: HOSPADM

## 2023-07-19 RX ORDER — SODIUM CHLORIDE 0.9 % (FLUSH) 0.9 %
5-40 SYRINGE (ML) INJECTION PRN
Status: DISCONTINUED | OUTPATIENT
Start: 2023-07-19 | End: 2023-07-23 | Stop reason: HOSPADM

## 2023-07-19 RX ORDER — ACETAMINOPHEN 325 MG/1
650 TABLET ORAL EVERY 6 HOURS PRN
Status: DISCONTINUED | OUTPATIENT
Start: 2023-07-19 | End: 2023-07-23 | Stop reason: HOSPADM

## 2023-07-19 RX ORDER — IPRATROPIUM BROMIDE AND ALBUTEROL SULFATE 2.5; .5 MG/3ML; MG/3ML
1 SOLUTION RESPIRATORY (INHALATION)
Status: DISPENSED | OUTPATIENT
Start: 2023-07-19 | End: 2023-07-19

## 2023-07-19 RX ORDER — IPRATROPIUM BROMIDE AND ALBUTEROL SULFATE 2.5; .5 MG/3ML; MG/3ML
1 SOLUTION RESPIRATORY (INHALATION)
Status: DISCONTINUED | OUTPATIENT
Start: 2023-07-19 | End: 2023-07-19

## 2023-07-19 RX ORDER — ONDANSETRON 2 MG/ML
4 INJECTION INTRAMUSCULAR; INTRAVENOUS EVERY 6 HOURS PRN
Status: DISCONTINUED | OUTPATIENT
Start: 2023-07-19 | End: 2023-07-23 | Stop reason: HOSPADM

## 2023-07-19 RX ORDER — AZITHROMYCIN 500 MG/1
500 TABLET, FILM COATED ORAL DAILY
Status: COMPLETED | OUTPATIENT
Start: 2023-07-19 | End: 2023-07-19

## 2023-07-19 RX ORDER — SODIUM CHLORIDE 0.9 % (FLUSH) 0.9 %
5-40 SYRINGE (ML) INJECTION EVERY 12 HOURS SCHEDULED
Status: DISCONTINUED | OUTPATIENT
Start: 2023-07-19 | End: 2023-07-23 | Stop reason: HOSPADM

## 2023-07-19 RX ORDER — ACETAMINOPHEN 650 MG/1
650 SUPPOSITORY RECTAL EVERY 6 HOURS PRN
Status: DISCONTINUED | OUTPATIENT
Start: 2023-07-19 | End: 2023-07-23 | Stop reason: HOSPADM

## 2023-07-19 RX ORDER — BENZONATATE 100 MG/1
100 CAPSULE ORAL 3 TIMES DAILY PRN
COMMUNITY

## 2023-07-19 RX ORDER — ENOXAPARIN SODIUM 100 MG/ML
40 INJECTION SUBCUTANEOUS DAILY
Status: DISCONTINUED | OUTPATIENT
Start: 2023-07-19 | End: 2023-07-21

## 2023-07-19 RX ORDER — ALBUTEROL SULFATE 2.5 MG/3ML
2.5 SOLUTION RESPIRATORY (INHALATION)
Status: DISCONTINUED | OUTPATIENT
Start: 2023-07-19 | End: 2023-07-23 | Stop reason: HOSPADM

## 2023-07-19 RX ORDER — POLYETHYLENE GLYCOL 3350 17 G/17G
17 POWDER, FOR SOLUTION ORAL DAILY PRN
Status: DISCONTINUED | OUTPATIENT
Start: 2023-07-19 | End: 2023-07-23 | Stop reason: HOSPADM

## 2023-07-19 RX ORDER — PREDNISONE 20 MG/1
40 TABLET ORAL DAILY
Status: DISCONTINUED | OUTPATIENT
Start: 2023-07-19 | End: 2023-07-23 | Stop reason: HOSPADM

## 2023-07-19 RX ADMIN — ENOXAPARIN SODIUM 40 MG: 100 INJECTION SUBCUTANEOUS at 20:08

## 2023-07-19 RX ADMIN — IPRATROPIUM BROMIDE AND ALBUTEROL SULFATE 1 DOSE: .5; 2.5 SOLUTION RESPIRATORY (INHALATION) at 17:00

## 2023-07-19 RX ADMIN — AZITHROMYCIN MONOHYDRATE 500 MG: 500 TABLET ORAL at 20:08

## 2023-07-19 RX ADMIN — IPRATROPIUM BROMIDE AND ALBUTEROL SULFATE 1 DOSE: .5; 3 SOLUTION RESPIRATORY (INHALATION) at 20:06

## 2023-07-19 RX ADMIN — Medication 10 ML: at 20:08

## 2023-07-19 RX ADMIN — IPRATROPIUM BROMIDE AND ALBUTEROL SULFATE 1 DOSE: .5; 2.5 SOLUTION RESPIRATORY (INHALATION) at 16:09

## 2023-07-19 RX ADMIN — CEFTRIAXONE SODIUM 1000 MG: 1 INJECTION, POWDER, FOR SOLUTION INTRAMUSCULAR; INTRAVENOUS at 17:11

## 2023-07-19 RX ADMIN — PREDNISONE 40 MG: 20 TABLET ORAL at 20:08

## 2023-07-19 ASSESSMENT — LIFESTYLE VARIABLES
HOW MANY STANDARD DRINKS CONTAINING ALCOHOL DO YOU HAVE ON A TYPICAL DAY: PATIENT DOES NOT DRINK
HOW OFTEN DO YOU HAVE A DRINK CONTAINING ALCOHOL: NEVER

## 2023-07-19 ASSESSMENT — ENCOUNTER SYMPTOMS
CHEST TIGHTNESS: 0
RHINORRHEA: 0
SHORTNESS OF BREATH: 1
BACK PAIN: 0

## 2023-07-19 ASSESSMENT — PAIN - FUNCTIONAL ASSESSMENT: PAIN_FUNCTIONAL_ASSESSMENT: NONE - DENIES PAIN

## 2023-07-19 NOTE — H&P
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 Ischemic changes to right second toe and left hallux.   Discussed two options: Continued local wound care with monitoring of demarcation vs. Amputation of right hallux and third toe. Wife states she would like to consider amputation as patient has severe pain to right foot at gangrenous toes and this could be a barrier to him participating in rehab. Pt's wife is concerned that severe pain in toes will further impair his ambulation and rehab potential.   Painted with betadine nursing orders in.   Arterial US ordered to eval for PAD  Xray B/L foot.     Will return tomorrow to discuss results.     Weightbearing Status: WBAT B/L  Offloading Device: DARCO shoe.     Venice Hatfield DPM PGY-3  Pager: [phone]    
7/19/2023  EXAMINATION: ONE XRAY VIEW OF THE CHEST 7/19/2023 3:51 pm COMPARISON: None. HISTORY: ORDERING SYSTEM PROVIDED HISTORY: Shortness of Breath TECHNOLOGIST PROVIDED HISTORY: Reason for exam:->Shortness of Breath What reading provider will be dictating this exam?->CRC FINDINGS: The lungs are hyper aerated which could indicate underlying COPD. There is a 0.9 cm calcified granuloma in the peripheral left mid lung. Moderate thoracic dextroscoliosis. The lungs are without acute focal process. There is no effusion or pneumothorax. The cardiomediastinal silhouette is without acute process. The osseous structures are without acute process. No acute process. The lungs are hyper aerated which could indicate underlying COPD. There is a 0.9 cm calcified granuloma in the peripheral left mid lung. Moderate thoracic dextroscoliosis. Assessment      Hospital Problems             Last Modified POA    * (Principal) COPD exacerbation ([address]) 7/19/2023 Yes       Plan   *COPD exacerbation  -Chest x-ray reveals no acute processes ; hyperinflated lungs indicating underlying COPD.  0.9 cm calcified granuloma in the peripheral left midlung. Moderate thoracic dextroscoliosis  -NIPPV  -ABGs  -Start IV azithromycin and Rocephin x5 days  -Prednisone 40 mg daily  -DuoNeb treatments    *Nicotine patch daily        Hospital medicine managing acute needs. Patient will need to follow-up with PCP for chronic disease management. Time spent with patient 35 minutes. Greater than 70% of time  spent focused exclusively on this patient ,reviewing  chart,  reconciling medications, &  answering questions with patient and discussing plan.     Consultations Ordered:  None    Electronically signed by NAOMI Dailey CNP on 7/19/23 at 5:23 PM EDT
Detail Level: Zone
Detail Level: Detailed

## 2023-07-19 NOTE — FLOWSHEET NOTE
Pt to 487. Oriented to room and call light. Chart and meds reviewed. Admission in progress. Family member at bedside. Will notify admitting physician pt received to floor.  Electronically signed by Marlene Haider RN on 7/19/2023 at 6:26 PM

## 2023-07-19 NOTE — CARE COORDINATION
Case Management Assessment  Initial Evaluation    Date/Time of Evaluation: 7/19/2023 6:27 PM  Assessment Completed by: Jaqui Martin RN    If patient is discharged prior to next notation, then this note serves as note for discharge by case management. Patient Name: Ciara Rodriguez                   YOB: 1950  Diagnosis: Hypercarbia [R06.89]  COPD exacerbation ([address]) [J44.1]                   Date / Time: 7/19/2023  3:24 PM    Patient Admission Status: Inpatient   Readmission Risk (Low < 19, Mod (19-27), High > 27): No data recorded  Current PCP: Phyllis Collins MD  PCP verified by CM? Yes    Chart Reviewed: Yes      History Provided by: Significant Other (pt currently on bi-pap & drowsy)  Patient Orientation: Alert and Oriented, Person, Place    Patient Cognition: Alert    Hospitalization in the last 30 days (Readmission):  No    If yes, Readmission Assessment in  Navigator will be completed. Advance Directives:      Code Status: Full Code   Patient's Primary Decision Maker is: Legal Next of Kin      Discharge Planning:    Patient lives with: Spouse/Significant Other Type of Home: House  Primary Care Giver: Self  Patient Support Systems include: Spouse/Significant Other   Current Financial resources: Medicare (MANAGED CARE)  Current community resources: None  Current services prior to admission: Oxygen Therapy            Current DME:  02 PRN NEBULIZER            Type of Home Care services:  None    ADLS  Prior functional level: Independent in ADLs/IADLs  Current functional level: Other (see comment) (UNABLE TO ASSESS ON BIPAP)      Family can provide assistance at DC: Yes  Would you like Case Management to discuss the discharge plan with any other family members/significant others, and if so, who?  Yes (SPOUSE)  Plans to Return to Present Housing: Yes  Other Identified Issues/Barriers to RETURNING to current housing: medical barriers  Potential Assistance needed at discharge: N/A

## 2023-07-19 NOTE — ED PROVIDER NOTES
oriented to person, place, and time. DIAGNOSTIC RESULTS     EKG: All EKG's are interpreted by the Emergency Department Physician who either signs or Co-signs this chart in the absence of a cardiologist.    Sinus tachycardia no ischemia    RADIOLOGY:   Non-plain film images such as CT, Ultrasound and MRI are read by the radiologist. Plain radiographic images are visualized and preliminarily interpreted by the emergency physician with the below findings:    Negative x-ray    Interpretation per the Radiologist below, if available at the time of this note:    XR CHEST PORTABLE   Final Result   No acute process. The lungs are hyper aerated which could indicate underlying COPD. There is a   0.9 cm calcified granuloma in the peripheral left mid lung. Moderate thoracic   dextroscoliosis.                ED BEDSIDE ULTRASOUND:   Performed by ED Physician - none    LABS:  Labs Reviewed   CBC WITH AUTO DIFFERENTIAL - Abnormal; Notable for the following components:       Result Value    WBC 15.5 (*)     RBC 3.84 (*)     MCV 97.0 (*)     MCH 32.4 (*)     Neutrophils Absolute 14.0 (*)     Lymphocytes Absolute 0.5 (*)     Monocytes Absolute 1.1 (*)     Bands Relative 1 (*)     All other components within normal limits   COMPREHENSIVE METABOLIC PANEL - Abnormal; Notable for the following components:    Sodium 126 (*)     Chloride 85 (*)     Glucose 139 (*)     Creatinine 0.36 (*)     Alkaline Phosphatase 143 (*)      (*)     All other components within normal limits   BASIC METABOLIC PANEL - Abnormal; Notable for the following components:    Sodium 124 (*)     Chloride 84 (*)     Glucose 143 (*)     Creatinine 0.32 (*)     All other components within normal limits   CBC WITH AUTO DIFFERENTIAL - Abnormal; Notable for the following components:    WBC 13.0 (*)     RBC 3.43 (*)     Hemoglobin 11.1 (*)     Hematocrit 32.2 (*)     MCH 32.4 (*)     Neutrophils Absolute 12.1 (*)     Lymphocytes Absolute 0.7 (*)     All

## 2023-07-19 NOTE — ED NOTES
Dr. Lisa Harding is aware of pt not having blood cultures draw and states that he does not need them drawn prior to antibiotic and that she is not being treated for infection but her COPD     Martha Luevano RN  07/19/23 [id number]

## 2023-07-20 LAB
ANION GAP SERPL CALCULATED.3IONS-SCNC: 10 MEQ/L (ref 9–15)
BASOPHILS # BLD: 0 K/UL (ref 0–0.2)
BASOPHILS NFR BLD: 0.1 %
BUN SERPL-MCNC: 20 MG/DL (ref 8–23)
CALCIUM SERPL-MCNC: 8.6 MG/DL (ref 8.5–9.9)
CHLORIDE SERPL-SCNC: 84 MEQ/L (ref 95–107)
CO2 SERPL-SCNC: 30 MEQ/L (ref 20–31)
CREAT SERPL-MCNC: 0.32 MG/DL (ref 0.5–0.9)
EKG ATRIAL RATE: 123 BPM
EKG P AXIS: 85 DEGREES
EKG P-R INTERVAL: 118 MS
EKG Q-T INTERVAL: 302 MS
EKG QRS DURATION: 74 MS
EKG QTC CALCULATION (BAZETT): 432 MS
EKG R AXIS: 70 DEGREES
EKG T AXIS: 45 DEGREES
EKG VENTRICULAR RATE: 123 BPM
EOSINOPHIL # BLD: 0 K/UL (ref 0–0.7)
EOSINOPHIL NFR BLD: 0 %
ERYTHROCYTE [DISTWIDTH] IN BLOOD BY AUTOMATED COUNT: 12.3 % (ref 11.5–14.5)
GLUCOSE SERPL-MCNC: 143 MG/DL (ref 70–99)
HCT VFR BLD AUTO: 32.2 % (ref 37–47)
HGB BLD-MCNC: 11.1 G/DL (ref 12–16)
LYMPHOCYTES # BLD: 0.7 K/UL (ref 1–4.8)
LYMPHOCYTES NFR BLD: 5.2 %
MCH RBC QN AUTO: 32.4 PG (ref 27–31.3)
MCHC RBC AUTO-ENTMCNC: 34.6 % (ref 33–37)
MCV RBC AUTO: 93.8 FL (ref 79.4–94.8)
MONOCYTES # BLD: 0.2 K/UL (ref 0.2–0.8)
MONOCYTES NFR BLD: 1.8 %
NEUTROPHILS # BLD: 12.1 K/UL (ref 1.4–6.5)
NEUTS SEG NFR BLD: 92.9 %
PLATELET # BLD AUTO: 290 K/UL (ref 130–400)
POTASSIUM SERPL-SCNC: 4.8 MEQ/L (ref 3.4–4.9)
RBC # BLD AUTO: 3.43 M/UL (ref 4.2–5.4)
SODIUM SERPL-SCNC: 124 MEQ/L (ref 135–144)
WBC # BLD AUTO: 13 K/UL (ref 4.8–10.8)

## 2023-07-20 PROCEDURE — 2580000003 HC RX 258: Performed by: INTERNAL MEDICINE

## 2023-07-20 PROCEDURE — 94761 N-INVAS EAR/PLS OXIMETRY MLT: CPT

## 2023-07-20 PROCEDURE — 6360000002 HC RX W HCPCS: Performed by: INTERNAL MEDICINE

## 2023-07-20 PROCEDURE — 2580000003 HC RX 258: Performed by: REGISTERED NURSE

## 2023-07-20 PROCEDURE — 80048 BASIC METABOLIC PNL TOTAL CA: CPT

## 2023-07-20 PROCEDURE — 6370000000 HC RX 637 (ALT 250 FOR IP): Performed by: INTERNAL MEDICINE

## 2023-07-20 PROCEDURE — 36415 COLL VENOUS BLD VENIPUNCTURE: CPT

## 2023-07-20 PROCEDURE — 85025 COMPLETE CBC W/AUTO DIFF WBC: CPT

## 2023-07-20 PROCEDURE — 94660 CPAP INITIATION&MGMT: CPT

## 2023-07-20 PROCEDURE — 6370000000 HC RX 637 (ALT 250 FOR IP): Performed by: REGISTERED NURSE

## 2023-07-20 PROCEDURE — 2700000000 HC OXYGEN THERAPY PER DAY

## 2023-07-20 PROCEDURE — 6360000002 HC RX W HCPCS: Performed by: REGISTERED NURSE

## 2023-07-20 PROCEDURE — 94640 AIRWAY INHALATION TREATMENT: CPT

## 2023-07-20 PROCEDURE — 1210000000 HC MED SURG R&B

## 2023-07-20 RX ADMIN — IPRATROPIUM BROMIDE AND ALBUTEROL SULFATE 1 DOSE: .5; 3 SOLUTION RESPIRATORY (INHALATION) at 07:31

## 2023-07-20 RX ADMIN — ALBUTEROL SULFATE 2.5 MG: 2.5 SOLUTION RESPIRATORY (INHALATION) at 03:34

## 2023-07-20 RX ADMIN — ACETAMINOPHEN 650 MG: 325 TABLET ORAL at 08:39

## 2023-07-20 RX ADMIN — Medication 10 ML: at 08:40

## 2023-07-20 RX ADMIN — IPRATROPIUM BROMIDE AND ALBUTEROL SULFATE 1 DOSE: .5; 3 SOLUTION RESPIRATORY (INHALATION) at 10:42

## 2023-07-20 RX ADMIN — AZITHROMYCIN MONOHYDRATE 250 MG: 250 TABLET ORAL at 08:39

## 2023-07-20 RX ADMIN — IPRATROPIUM BROMIDE AND ALBUTEROL SULFATE 1 DOSE: .5; 3 SOLUTION RESPIRATORY (INHALATION) at 19:59

## 2023-07-20 RX ADMIN — PREDNISONE 40 MG: 20 TABLET ORAL at 08:39

## 2023-07-20 RX ADMIN — ENOXAPARIN SODIUM 40 MG: 100 INJECTION SUBCUTANEOUS at 22:36

## 2023-07-20 RX ADMIN — IPRATROPIUM BROMIDE AND ALBUTEROL SULFATE 1 DOSE: .5; 3 SOLUTION RESPIRATORY (INHALATION) at 13:13

## 2023-07-20 RX ADMIN — Medication 10 ML: at 22:38

## 2023-07-20 RX ADMIN — CEFTRIAXONE SODIUM 1000 MG: 1 INJECTION, POWDER, FOR SOLUTION INTRAMUSCULAR; INTRAVENOUS at 16:01

## 2023-07-20 NOTE — CONSULTS
Pt was consulted concerning HCPOA, pt at this at this time not interested in completing the document, pt would like a follow up visit, pt is a little anxious concerning her health, pt mother will be 95 this year,    Pt appreciated the visit and prayed for the pt before leaving the room

## 2023-07-20 NOTE — ACP (ADVANCE CARE PLANNING)
Advance Care Planning   Healthcare Decision Maker:    Primary Decision Maker: Charly Johnston - [phone]    Secondary Decision Maker: Emmanuel Marks - Child - [phone]    Click here to complete Healthcare Decision Makers including selection of the Healthcare Decision Maker Relationship (ie \"Primary\").

## 2023-07-20 NOTE — CARE COORDINATION
Definition of COPD discussed with the patient and her spouse at the bedside. .  Lung function explained. Types of COPD differentiated for patient. Symptoms discussed including: difficulty breathing, SOB, coughing, excess mucous, weakness and fatigue, or wheezing. Causes discussed. Pt continues smoking almost a pack per day and has been exposed to air pollution or dust. She tried to quit and a week later restarted. Encouraged patient to continue to try to quit smoking. Different testing for COPD and most common treatments reviewed. Importance of preventing infection including hand hygiene, keeping inhaler mouthpieces clean, and getting the flu and pneumonia vaccinations. Patient states they do not get the flu or Covid vaccines, she does not want that, but per her spouse, she did get the pneumonia vaccine he believes. Care Map of what to expect while hospitalized reviewed with patient. Ways to ease SOB explained including pursed lip breathing and diaphragmatic breathing. Energy conservation discussed. Possible medications that may be ordered were reviewed. I stressed that their physician would make that decision and explained the importance of taking the medication as directed. Patient states she has home oxygen with portability at home. She uses 2 L, she has inhalers and nebulizer that she can use as needed. Good nutrition choices discussed. Pt denies losing weight. COPD booklet and zone pamphlet left for patient review. Patient denies any further questions at this time.  Electronically signed by José Miguel Lewis RN on 7/20/2023 at 11:49 AM

## 2023-07-20 NOTE — CARE COORDINATION
Case Management Assessment  Initial Evaluation    Date/Time of Evaluation: 7/20/2023 3:46 PM  Assessment Completed by: Caryle Galley    If patient is discharged prior to next notation, then this note serves as note for discharge by case management. Patient Name: Surinder Aguilar                   YOB: 1950  Diagnosis: Hypercarbia [R06.89]  COPD exacerbation ([address]) [J44.1]                   Date / Time: 7/19/2023  3:24 PM    Patient Admission Status: Inpatient   Readmission Risk (Low < 19, Mod (19-27), High > 27): Readmission Risk Score: 11.2    Current PCP: Mayur Jewell MD  PCP verified by CM? Yes    Chart Reviewed: Yes      History Provided by: Patient  Patient Orientation: Alert and Oriented, Person, Place, Situation, Self, Other (see comment) (Pt was difficult to arouse and once she was awake she was alert and oriented.)    Patient Cognition: Alert    Hospitalization in the last 30 days (Readmission):  No    If yes, Readmission Assessment in  Navigator will be completed. Advance Directives:      Code Status: Full Code   Patient's Primary Decision Maker is: Legal Next of Kin    Primary Decision MakeEddie Hanks - Spouse - [phone]    Secondary Decision Maker: Emmanuel Marks Child - [phone]    Discharge Planning:    Patient lives with: Spouse/Significant Other Type of Home: House  Primary Care Giver: Self  Patient Support Systems include: Spouse/Significant Other, Children, Family Members   Current Financial resources: Medicare (MANAGED CARE)  Current community resources: None  Current services prior to admission: Oxygen Therapy            Current DME:  Oxygen at hs,nebulizer             Type of Home Care services:  None    ADLS  Prior functional level: Independent in ADLs/IADLs  Current functional level: Independent in ADLs/IADLs    PT AM-PAC:   /24  OT AM-PAC:   /24    Family can provide assistance at DC:  Yes  Would you like Case Management to discuss the discharge plan

## 2023-07-20 NOTE — PLAN OF CARE
Problem: Discharge Planning  Goal: Discharge to home or other facility with appropriate resources  Outcome: Progressing  Flowsheets  Taken 7/19/2023 1945 by Charleen Bocanegra RN  Discharge to home or other facility with appropriate resources:   Identify barriers to discharge with patient and caregiver   Arrange for needed discharge resources and transportation as appropriate   Identify discharge learning needs (meds, wound care, etc)   Refer to discharge planning if patient needs post-hospital services based on physician order or complex needs related to functional status, cognitive ability or social support system  Taken 7/19/2023 1811 by Carie Shook RN  Discharge to home or other facility with appropriate resources:   Identify barriers to discharge with patient and caregiver   Identify discharge learning needs (meds, wound care, etc)   Arrange for needed discharge resources and transportation as appropriate   Arrange for interpreters to assist at discharge as needed     Problem: Safety - Adult  Goal: Free from fall injury  Outcome: Progressing     Problem: Chronic Conditions and Co-morbidities  Goal: Patient's chronic conditions and co-morbidity symptoms are monitored and maintained or improved  Outcome: Progressing  Flowsheets (Taken 7/19/2023 1945)  Care Plan - Patient's Chronic Conditions and Co-Morbidity Symptoms are Monitored and Maintained or Improved:   Monitor and assess patient's chronic conditions and comorbid symptoms for stability, deterioration, or improvement   Collaborate with multidisciplinary team to address chronic and comorbid conditions and prevent exacerbation or deterioration   Update acute care plan with appropriate goals if chronic or comorbid symptoms are exacerbated and prevent overall improvement and discharge

## 2023-07-21 LAB
D DIMER PPP FEU-MCNC: 0.58 MG/L FEU (ref 0–0.5)
ERYTHROCYTE [DISTWIDTH] IN BLOOD BY AUTOMATED COUNT: 12.9 % (ref 11.5–14.5)
HCT VFR BLD AUTO: 39.3 % (ref 37–47)
HGB BLD-MCNC: 13.2 G/DL (ref 12–16)
LV EF: 60 %
LVEF MODALITY: NORMAL
MCH RBC QN AUTO: 32.4 PG (ref 27–31.3)
MCHC RBC AUTO-ENTMCNC: 33.6 % (ref 33–37)
MCV RBC AUTO: 96.4 FL (ref 79.4–94.8)
PLATELET # BLD AUTO: 383 K/UL (ref 130–400)
RBC # BLD AUTO: 4.08 M/UL (ref 4.2–5.4)
TSH REFLEX: 0.87 UIU/ML (ref 0.44–3.86)
WBC # BLD AUTO: 17.1 K/UL (ref 4.8–10.8)

## 2023-07-21 PROCEDURE — 93005 ELECTROCARDIOGRAM TRACING: CPT | Performed by: INTERNAL MEDICINE

## 2023-07-21 PROCEDURE — 2500000003 HC RX 250 WO HCPCS: Performed by: INTERNAL MEDICINE

## 2023-07-21 PROCEDURE — 2700000000 HC OXYGEN THERAPY PER DAY

## 2023-07-21 PROCEDURE — 6360000002 HC RX W HCPCS: Performed by: REGISTERED NURSE

## 2023-07-21 PROCEDURE — 6370000000 HC RX 637 (ALT 250 FOR IP): Performed by: REGISTERED NURSE

## 2023-07-21 PROCEDURE — 6370000000 HC RX 637 (ALT 250 FOR IP): Performed by: INTERNAL MEDICINE

## 2023-07-21 PROCEDURE — 1210000000 HC MED SURG R&B

## 2023-07-21 PROCEDURE — 94640 AIRWAY INHALATION TREATMENT: CPT

## 2023-07-21 PROCEDURE — 85027 COMPLETE CBC AUTOMATED: CPT

## 2023-07-21 PROCEDURE — 2580000003 HC RX 258: Performed by: REGISTERED NURSE

## 2023-07-21 PROCEDURE — 36415 COLL VENOUS BLD VENIPUNCTURE: CPT

## 2023-07-21 PROCEDURE — 93306 TTE W/DOPPLER COMPLETE: CPT

## 2023-07-21 PROCEDURE — 2580000003 HC RX 258: Performed by: INTERNAL MEDICINE

## 2023-07-21 PROCEDURE — 84443 ASSAY THYROID STIM HORMONE: CPT

## 2023-07-21 PROCEDURE — 94761 N-INVAS EAR/PLS OXIMETRY MLT: CPT

## 2023-07-21 PROCEDURE — 85379 FIBRIN DEGRADATION QUANT: CPT

## 2023-07-21 RX ORDER — 0.9 % SODIUM CHLORIDE 0.9 %
250 INTRAVENOUS SOLUTION INTRAVENOUS ONCE AS NEEDED
Status: DISCONTINUED | OUTPATIENT
Start: 2023-07-21 | End: 2023-07-23 | Stop reason: HOSPADM

## 2023-07-21 RX ORDER — DILTIAZEM HYDROCHLORIDE 60 MG/1
30 TABLET, FILM COATED ORAL EVERY 6 HOURS SCHEDULED
Status: DISCONTINUED | OUTPATIENT
Start: 2023-07-21 | End: 2023-07-21

## 2023-07-21 RX ORDER — DILTIAZEM HYDROCHLORIDE 5 MG/ML
20 INJECTION INTRAVENOUS ONCE
Status: COMPLETED | OUTPATIENT
Start: 2023-07-21 | End: 2023-07-21

## 2023-07-21 RX ORDER — DILTIAZEM HYDROCHLORIDE 60 MG/1
60 TABLET, FILM COATED ORAL EVERY 6 HOURS SCHEDULED
Status: DISCONTINUED | OUTPATIENT
Start: 2023-07-22 | End: 2023-07-23 | Stop reason: HOSPADM

## 2023-07-21 RX ADMIN — Medication 10 ML: at 23:05

## 2023-07-21 RX ADMIN — DILTIAZEM HYDROCHLORIDE 30 MG: 60 TABLET, FILM COATED ORAL at 16:19

## 2023-07-21 RX ADMIN — IPRATROPIUM BROMIDE AND ALBUTEROL SULFATE 1 DOSE: .5; 3 SOLUTION RESPIRATORY (INHALATION) at 15:08

## 2023-07-21 RX ADMIN — IPRATROPIUM BROMIDE AND ALBUTEROL SULFATE 1 DOSE: .5; 3 SOLUTION RESPIRATORY (INHALATION) at 07:41

## 2023-07-21 RX ADMIN — IPRATROPIUM BROMIDE AND ALBUTEROL SULFATE 1 DOSE: .5; 3 SOLUTION RESPIRATORY (INHALATION) at 04:20

## 2023-07-21 RX ADMIN — ACETAMINOPHEN 650 MG: 325 TABLET ORAL at 09:05

## 2023-07-21 RX ADMIN — DILTIAZEM HYDROCHLORIDE 20 MG: 5 INJECTION INTRAVENOUS at 09:41

## 2023-07-21 RX ADMIN — Medication 10 ML: at 09:05

## 2023-07-21 RX ADMIN — PREDNISONE 40 MG: 20 TABLET ORAL at 09:05

## 2023-07-21 RX ADMIN — DILTIAZEM HYDROCHLORIDE 30 MG: 60 TABLET, FILM COATED ORAL at 10:38

## 2023-07-21 RX ADMIN — AZITHROMYCIN MONOHYDRATE 250 MG: 250 TABLET ORAL at 09:05

## 2023-07-21 RX ADMIN — RIVAROXABAN 20 MG: 20 TABLET, FILM COATED ORAL at 16:19

## 2023-07-21 RX ADMIN — DILTIAZEM HYDROCHLORIDE 60 MG: 60 TABLET, FILM COATED ORAL at 23:59

## 2023-07-21 RX ADMIN — CEFTRIAXONE SODIUM 1000 MG: 1 INJECTION, POWDER, FOR SOLUTION INTRAMUSCULAR; INTRAVENOUS at 16:20

## 2023-07-21 RX ADMIN — IPRATROPIUM BROMIDE AND ALBUTEROL SULFATE 1 DOSE: .5; 3 SOLUTION RESPIRATORY (INHALATION) at 19:37

## 2023-07-21 ASSESSMENT — PAIN SCALES - GENERAL: PAINLEVEL_OUTOF10: 2

## 2023-07-21 NOTE — CARE COORDINATION
Team nrsg rounds done this am and also rounds earlier with Dr. Winston Basilio. Pt plan is home with spouse but not ready for dc today.

## 2023-07-22 LAB
ANION GAP SERPL CALCULATED.3IONS-SCNC: 6 MEQ/L (ref 9–15)
BUN SERPL-MCNC: 11 MG/DL (ref 8–23)
CALCIUM SERPL-MCNC: 9 MG/DL (ref 8.5–9.9)
CHLORIDE SERPL-SCNC: 90 MEQ/L (ref 95–107)
CO2 SERPL-SCNC: 37 MEQ/L (ref 20–31)
CREAT SERPL-MCNC: 0.44 MG/DL (ref 0.5–0.9)
GLUCOSE SERPL-MCNC: 93 MG/DL (ref 70–99)
POTASSIUM SERPL-SCNC: 4.1 MEQ/L (ref 3.4–4.9)
SODIUM SERPL-SCNC: 133 MEQ/L (ref 135–144)

## 2023-07-22 PROCEDURE — 1210000000 HC MED SURG R&B

## 2023-07-22 PROCEDURE — 94664 DEMO&/EVAL PT USE INHALER: CPT

## 2023-07-22 PROCEDURE — 94640 AIRWAY INHALATION TREATMENT: CPT

## 2023-07-22 PROCEDURE — 6360000002 HC RX W HCPCS: Performed by: REGISTERED NURSE

## 2023-07-22 PROCEDURE — 6370000000 HC RX 637 (ALT 250 FOR IP): Performed by: INTERNAL MEDICINE

## 2023-07-22 PROCEDURE — 36415 COLL VENOUS BLD VENIPUNCTURE: CPT

## 2023-07-22 PROCEDURE — 2700000000 HC OXYGEN THERAPY PER DAY

## 2023-07-22 PROCEDURE — 2580000003 HC RX 258: Performed by: INTERNAL MEDICINE

## 2023-07-22 PROCEDURE — 94761 N-INVAS EAR/PLS OXIMETRY MLT: CPT

## 2023-07-22 PROCEDURE — 2580000003 HC RX 258: Performed by: REGISTERED NURSE

## 2023-07-22 PROCEDURE — 6370000000 HC RX 637 (ALT 250 FOR IP): Performed by: REGISTERED NURSE

## 2023-07-22 PROCEDURE — 80048 BASIC METABOLIC PNL TOTAL CA: CPT

## 2023-07-22 RX ORDER — DILTIAZEM HYDROCHLORIDE 240 MG/1
240 CAPSULE, COATED, EXTENDED RELEASE ORAL DAILY
Qty: 30 CAPSULE | Refills: 0 | Status: SHIPPED | OUTPATIENT
Start: 2023-07-22

## 2023-07-22 RX ORDER — PREDNISONE 10 MG/1
TABLET ORAL
Qty: 30 TABLET | Refills: 0 | Status: SHIPPED | OUTPATIENT
Start: 2023-07-22

## 2023-07-22 RX ORDER — AZITHROMYCIN 250 MG/1
250 TABLET, FILM COATED ORAL DAILY
Qty: 4 TABLET | Refills: 0 | Status: SHIPPED | OUTPATIENT
Start: 2023-07-23 | End: 2023-07-27

## 2023-07-22 RX ADMIN — ACETAMINOPHEN 650 MG: 325 TABLET ORAL at 09:27

## 2023-07-22 RX ADMIN — Medication 10 ML: at 09:28

## 2023-07-22 RX ADMIN — IPRATROPIUM BROMIDE AND ALBUTEROL SULFATE 1 DOSE: .5; 3 SOLUTION RESPIRATORY (INHALATION) at 13:09

## 2023-07-22 RX ADMIN — AZITHROMYCIN MONOHYDRATE 250 MG: 250 TABLET ORAL at 09:27

## 2023-07-22 RX ADMIN — POLYETHYLENE GLYCOL 3350 17 G: 17 POWDER, FOR SOLUTION ORAL at 20:18

## 2023-07-22 RX ADMIN — DILTIAZEM HYDROCHLORIDE 60 MG: 60 TABLET, FILM COATED ORAL at 05:32

## 2023-07-22 RX ADMIN — IPRATROPIUM BROMIDE AND ALBUTEROL SULFATE 1 DOSE: .5; 3 SOLUTION RESPIRATORY (INHALATION) at 07:37

## 2023-07-22 RX ADMIN — DILTIAZEM HYDROCHLORIDE 60 MG: 60 TABLET, FILM COATED ORAL at 17:23

## 2023-07-22 RX ADMIN — Medication 10 ML: at 20:18

## 2023-07-22 RX ADMIN — PREDNISONE 40 MG: 20 TABLET ORAL at 09:27

## 2023-07-22 RX ADMIN — CEFTRIAXONE SODIUM 1000 MG: 1 INJECTION, POWDER, FOR SOLUTION INTRAMUSCULAR; INTRAVENOUS at 16:52

## 2023-07-22 RX ADMIN — IPRATROPIUM BROMIDE AND ALBUTEROL SULFATE 1 DOSE: .5; 3 SOLUTION RESPIRATORY (INHALATION) at 19:44

## 2023-07-22 RX ADMIN — RIVAROXABAN 20 MG: 20 TABLET, FILM COATED ORAL at 17:25

## 2023-07-22 RX ADMIN — DILTIAZEM HYDROCHLORIDE 60 MG: 60 TABLET, FILM COATED ORAL at 12:07

## 2023-07-22 ASSESSMENT — PAIN DESCRIPTION - LOCATION: LOCATION: HEAD

## 2023-07-22 ASSESSMENT — PAIN - FUNCTIONAL ASSESSMENT: PAIN_FUNCTIONAL_ASSESSMENT: ACTIVITIES ARE NOT PREVENTED

## 2023-07-22 ASSESSMENT — PAIN SCALES - GENERAL: PAINLEVEL_OUTOF10: 3

## 2023-07-22 ASSESSMENT — PAIN DESCRIPTION - DESCRIPTORS: DESCRIPTORS: ACHING

## 2023-07-22 ASSESSMENT — PAIN DESCRIPTION - ORIENTATION: ORIENTATION: RIGHT;LEFT;ANTERIOR

## 2023-07-22 NOTE — DISCHARGE SUMMARY
tablet  Commonly known as: VITAMIN C     aspirin 81 MG EC tablet  Take 1 tablet by mouth daily     buPROPion 150 MG extended release tablet  Commonly known as: WELLBUTRIN SR     EPINEPHrine 0.3 MG/0.3ML Soaj injection  Commonly known as: EPIPEN     fexofenadine 180 MG tablet  Commonly known as: ALLEGRA     ipratropium 0.5 mg-albuterol 2.5 mg 0.5-2.5 (3) MG/3ML Soln nebulizer solution  Commonly known as: DUONEB  Inhale 3 mLs into the lungs every 6 hours as needed for Shortness of Breath     Magnesium Gluconate 250 MG Tabs     nicotine 14 MG/24HR  Commonly known as: NICODERM CQ  Place 1 patch onto the skin daily     Omega-3 500 MG Caps     rosuvastatin 20 MG tablet  Commonly known as: CRESTOR     Tessalon Perles 100 MG capsule  Generic drug: benzonatate     tiotropium-olodaterol 2.5-2.5 MCG/ACT Aers  Commonly known as: STIOLTO     vitamin D3 10 MCG (400 UNIT) Tabs tablet  Commonly known as: CHOLECALCIFEROL     Zinc 100 MG Tabs            STOP taking these medications      amLODIPine 5 MG tablet  Commonly known as: NORVASC               Where to Get Your Medications        These medications were sent to [address] [place] [address] [address] [place] [address], [address] [place] [address]      Phone: [phone]   azithromycin 250 MG tablet  dilTIAZem 240 MG extended release capsule  predniSONE 10 MG tablet  rivaroxaban 20 MG Tabs tablet           Disposition:   If discharged to Home, Any [address] [place] [address] needs that were indicated and/or required as been addressed and set up by Social Work. Condition at discharge: Pt was medically stable at the time of discharge. Activity: activity as tolerated    Total time taken for discharging this patient: 40 minutes. Greater than 70% of time was spent focused exclusively on this patient. Time was taken to review chart, discuss plans with consultants, reconciling medications, discussing plan answering questions with patient.

## 2023-07-22 NOTE — CARE COORDINATION
RECEIVED UPDATE FROM NURSE TO INFORM THAT PT DOES NOT FEEL READY AND STRONG ENOUGH TO GO HOME. PT'S GAIT IS \"WOBBLY\" SHE INFORMED DR. MYRICK VIA PS. WILL FOLLOW FOR PT/OT EVALS AND ASSESS NEEDS.

## 2023-07-23 VITALS
BODY MASS INDEX: 16.36 KG/M2 | WEIGHT: 98.2 LBS | OXYGEN SATURATION: 96 % | DIASTOLIC BLOOD PRESSURE: 50 MMHG | RESPIRATION RATE: 18 BRPM | HEART RATE: 76 BPM | HEIGHT: 65 IN | SYSTOLIC BLOOD PRESSURE: 118 MMHG | TEMPERATURE: 98.2 F

## 2023-07-23 PROCEDURE — 2580000003 HC RX 258: Performed by: INTERNAL MEDICINE

## 2023-07-23 PROCEDURE — 94640 AIRWAY INHALATION TREATMENT: CPT

## 2023-07-23 PROCEDURE — 97161 PT EVAL LOW COMPLEX 20 MIN: CPT

## 2023-07-23 PROCEDURE — 94761 N-INVAS EAR/PLS OXIMETRY MLT: CPT

## 2023-07-23 PROCEDURE — 2700000000 HC OXYGEN THERAPY PER DAY

## 2023-07-23 PROCEDURE — 6370000000 HC RX 637 (ALT 250 FOR IP): Performed by: REGISTERED NURSE

## 2023-07-23 PROCEDURE — 6370000000 HC RX 637 (ALT 250 FOR IP): Performed by: INTERNAL MEDICINE

## 2023-07-23 PROCEDURE — 97165 OT EVAL LOW COMPLEX 30 MIN: CPT

## 2023-07-23 RX ADMIN — DILTIAZEM HYDROCHLORIDE 60 MG: 60 TABLET, FILM COATED ORAL at 00:47

## 2023-07-23 RX ADMIN — PREDNISONE 40 MG: 20 TABLET ORAL at 09:22

## 2023-07-23 RX ADMIN — ACETAMINOPHEN 650 MG: 325 TABLET ORAL at 00:54

## 2023-07-23 RX ADMIN — DILTIAZEM HYDROCHLORIDE 60 MG: 60 TABLET, FILM COATED ORAL at 07:09

## 2023-07-23 RX ADMIN — DILTIAZEM HYDROCHLORIDE 60 MG: 60 TABLET, FILM COATED ORAL at 12:00

## 2023-07-23 RX ADMIN — Medication 10 ML: at 09:22

## 2023-07-23 RX ADMIN — AZITHROMYCIN MONOHYDRATE 250 MG: 250 TABLET ORAL at 09:22

## 2023-07-23 RX ADMIN — IPRATROPIUM BROMIDE AND ALBUTEROL SULFATE 1 DOSE: .5; 3 SOLUTION RESPIRATORY (INHALATION) at 13:55

## 2023-07-23 RX ADMIN — IPRATROPIUM BROMIDE AND ALBUTEROL SULFATE 1 DOSE: .5; 3 SOLUTION RESPIRATORY (INHALATION) at 07:16

## 2023-07-23 ASSESSMENT — PAIN SCALES - GENERAL
PAINLEVEL_OUTOF10: 0
PAINLEVEL_OUTOF10: 2

## 2023-07-23 ASSESSMENT — PAIN DESCRIPTION - LOCATION: LOCATION: BACK;HIP

## 2023-07-23 ASSESSMENT — PAIN DESCRIPTION - ORIENTATION: ORIENTATION: RIGHT;LEFT

## 2023-07-23 NOTE — DISCHARGE INSTRUCTIONS
Please call and schedule a follow-up appt with your Cardiologist or PCP in 1-2 weeks. You will need additional refills on your Xarelto medication.

## 2023-07-24 LAB
EKG ATRIAL RATE: 366 BPM
EKG Q-T INTERVAL: 318 MS
EKG QRS DURATION: 84 MS
EKG QTC CALCULATION (BAZETT): 481 MS
EKG R AXIS: 80 DEGREES
EKG T AXIS: -75 DEGREES
EKG VENTRICULAR RATE: 138 BPM

## 2023-07-24 PROCEDURE — 93010 ELECTROCARDIOGRAM REPORT: CPT | Performed by: INTERNAL MEDICINE

## 2023-07-27 LAB
EKG ATRIAL RATE: 107 BPM
EKG ATRIAL RATE: 156 BPM
EKG Q-T INTERVAL: 282 MS
EKG Q-T INTERVAL: 308 MS
EKG QRS DURATION: 84 MS
EKG QRS DURATION: 90 MS
EKG QTC CALCULATION (BAZETT): 377 MS
EKG QTC CALCULATION (BAZETT): 466 MS
EKG R AXIS: 78 DEGREES
EKG R AXIS: 80 DEGREES
EKG T AXIS: -40 DEGREES
EKG T AXIS: 45 DEGREES
EKG VENTRICULAR RATE: 108 BPM
EKG VENTRICULAR RATE: 138 BPM

## 2023-07-27 PROCEDURE — 93010 ELECTROCARDIOGRAM REPORT: CPT | Performed by: INTERNAL MEDICINE
